# Patient Record
Sex: MALE | Race: WHITE | NOT HISPANIC OR LATINO | Employment: FULL TIME | URBAN - METROPOLITAN AREA
[De-identification: names, ages, dates, MRNs, and addresses within clinical notes are randomized per-mention and may not be internally consistent; named-entity substitution may affect disease eponyms.]

---

## 2023-05-09 ENCOUNTER — OFFICE VISIT (OUTPATIENT)
Age: 68
End: 2023-05-09

## 2023-05-09 VITALS — WEIGHT: 200.4 LBS | HEIGHT: 71 IN | TEMPERATURE: 98.2 F | BODY MASS INDEX: 28.06 KG/M2

## 2023-05-09 DIAGNOSIS — L72.0 EPIDERMAL CYST: ICD-10-CM

## 2023-05-09 DIAGNOSIS — B07.9 VERRUCA: ICD-10-CM

## 2023-05-09 DIAGNOSIS — D48.5 NEOPLASM OF UNCERTAIN BEHAVIOR OF SKIN: Primary | ICD-10-CM

## 2023-05-09 NOTE — PATIENT INSTRUCTIONS
EPIDERMAL INCLUSION CYST      Assessment and Plan:  Based on a thorough discussion of this condition and the management approach to it (including a comprehensive discussion of the known risks, side effects and potential benefits of treatment), the patient (family) agrees to implement the following specific plan:  Recommend evaluation by hand surgeon in case tendon is involoved  What are epidermal inclusion cysts? Epidermal inclusion cysts are the most common, benign cutaneous cysts  There are many different names for epidermal inclusion cysts, including epidermoid cyst, epidermal cyst, infundibular cyst, inclusion cyst, and keratin cyst  These cysts can occur anywhere on the body and typically present as nodules directly underneath the skin  There is often a visible pore or opening in the center  The cysts are freely moveable and can range from a few millimeters to several centimeters in diameter  The center of epidermoid cysts almost always contains keratin, which has a cheesy appearance, and not sebum  They also do not originate from sebaceous glands  Therefore, epidermal inclusion cysts are not the same as sebaceous cysts  Cysts may remain stable or progressively enlarge over time  There are no reliable predictive factors to tell if an epidermal inclusion cyst will enlarge, become inflamed, or remain quiescent  Infected cysts tend to become larger, turn red, and are more noticeable to the patient  There may be accompanying pain and discomfort  What causes epidermal inclusion cysts? Epidermal inclusion cysts often appear out of the blue and are not contagious  They are due to a proliferation of epidermal cells within the dermis and are more common in men than women  They occur more frequently in patients in their 20s to 45s   Epidermal inclusion cysts by themselves are usually not inherited, but they can be hereditary in rare syndromes such as South syndrome, nodular elastosis with cysts and comedones (Favre-Racouchot syndrome), and basal cell nevus syndrome (Gorlin syndrome)  Elderly patients with chronic sun-damaged skin areas have a higher likelihood of developing epidermoid cysts  They often occur in areas where hair follicles have been inflamed or repeatedly irritated are more frequent in patients with acne vulgaris  In the  period, they are called milia  Patients on BRAF inhibitors such as imiquimod and cyclosporine have a higher incidence of epidermoid cysts of the face  How do we diagnose an epidermal inclusion cyst?  Epidermoid inclusion cysts are often diagnosed by history and physical exam  There is usually no need for biopsy prior to removal   Radiographic and laboratory exams, such as ultrasound studies, are unnecessary and not typically ordered unless the practitioner suspects a genetic condition  What is the treatment for an epidermal inclusion cyst?  Inflamed, uninfected epidermal inclusion cysts rarely resolve spontaneously without therapy or surgical intervention  Treatment is not emergent unless desired by the patient  Definitive treatment is via surgical excision with walls intact  This method will prevent recurrence  This is best done when the cyst is not inflamed, to decrease the probability of rupture during surgery  A local anesthetic will be injected around the cyst  A small incision is made in the skin overlying the cyst, and contents are expressed  The incision is repaired with sutures    Another option is to use a 4mm punch biopsy with cyst extraction through the defect  Incision and drainage is often needed if the cyst is infected or inflamed  If there is surrounding cellulitis, oral antibiotic therapy may be necessary  The common agents used target methicillin sensitive Staphylococcal aureus and methicillin resistant S aureus in areas of high prevalence       INFORMED CONSENT DISCUSSION AND POST-OPERATIVE INSTRUCTIONS FOR PATIENT    I   RATIONALE FOR "PROCEDURE  I understand that a skin biopsy allows the Dermatologist to test a lesion or rash under the microscope to obtain a diagnosis  It usually involves numbing the area with numbing medication and removing a small piece of skin; sometimes the area will be closed with sutures  In this specific procedure, sutures are not usually needed  If any sutures are placed, then they are usually need to be removed in 2 weeks or less  I understand that my Dermatologist recommends that a skin \"shave\" biopsy be performed today  A local anesthetic, similar to the kind that a dentist uses when filling a cavity, will be injected with a very small needle into the skin area to be sampled  The injected skin and tissue underneath \"will go to sleep” and become numb so no pain should be felt afterwards  An instrument shaped like a tiny \"razor blade\" (shave biopsy instrument) will be used to cut a small piece of tissue and skin from the area so that a sample of tissue can be taken and examined more closely under the microscope  A slight amount of bleeding will occur, but it will be stopped with direct pressure and a pressure bandage and any other appropriate methods  I understands that a scar will form where the wound was created  Surgical ointment will be applied to help protect the wound  Sutures are not usually needed      II   RISKS AND POTENTIAL COMPLICATIONS   I understand the risks and potential complications of a skin biopsy include but are not limited to the following:  Bleeding  Infection  Pain  Scar/keloid  Skin discoloration  Incomplete Removal  Recurrence  Nerve Damage/Numbness/Loss of Function  Allergic Reaction to Anesthesia  Biopsies are diagnostic procedures and based on findings additional treatment or evaluation may be required  Loss or destruction of specimen resulting in no additional findings    My Dermatologist has explained to me the nature of the condition, the nature of the procedure, and the " "benefits to be reasonably expected compared with alternative approaches  My Dermatologist has discussed the likelihood of major risks or complications of this procedure including the specific risks listed above, such as bleeding, infection, and scarring/keloid  I understand that a scar is expected after this procedure  I understand that my physician cannot predict if the scar will form a \"keloid,\" which extends beyond the borders of the wound that is created  A keloid is a thick, painful, and bumpy scar  A keloid can be difficult to treat, as it does not always respond well to therapy, which includes injecting cortisone directly into the keloid every few weeks  While this usually reduces the pain and size of the scar, it does not eliminate it  I understand that photographs may be taken before and after the procedure  These will be maintained as part of the medical providers confidential records and may not be made available to me  I further authorize the medical provider to use the photographs for teaching purposes or to illustrate scientific papers, books, or lectures if in his/her judgment, medical research, education, or science may benefit from its use  I have had an opportunity to fully inquire about the risks and benefits of this procedure and its alternatives  I have been given ample time and opportunity to ask questions and to seek a second opinion if I wished to do so  I acknowledge that there have specifically been no guarantees as to the cosmetic results from the procedure  I am aware that with any procedure there is always the possibility of an unexpected complication  III  POST-PROCEDURAL CARE (WHAT YOU WILL NEED TO DO \"AFTER THE BIOPSY\" TO OPTIMIZE HEALING)    Keep the area clean and dry  Try NOT to remove the bandage or get it wet for the first 24 hours      Gently clean the area and apply surgical ointment (such as Vaseline petrolatum ointment, which is available \"over the " "counter\" and not a prescription) to the biopsy site for up to 2 weeks straight  This acts to protect the wound from the outside world  Sutures are not usually placed in this procedure  If any sutures were placed, return for suture removal as instructed (generally 1 week for the face, 2 weeks for the body)  Take Acetaminophen (Tylenol) for discomfort, if no contraindications  Ibuprofen or aspirin could make bleeding worse  Call our office immediately for signs of infection: fever, chills, increased redness, warmth, tenderness, discomfort/pain, or pus or foul smell coming from the wound  WHAT TO DO IF THERE IS ANY BLEEDING? If a small amount of bleeding is noticed, place a clean cloth over the area and apply firm pressure for ten minutes  Check the wound after 10 minutes of direct pressure  If bleeding persists, try one more time for an additional 10 minutes of direct pressure on the area  If the bleeding becomes heavier or does not stop after the second attempt, or if you have any other questions about this procedure, then please call your [facility] Dermatologist by calling [phone] (SKIN)  I hereby acknowledge that I have reviewed and verified the site with my Dermatologist and have requested and authorized my Dermatologist to proceed with the procedure  VERRUCA VULGARIS (\"Common Warts\")    Assessment and Plan:  Based on a thorough discussion of this condition and the management approach to it (including a comprehensive discussion of the known risks, side effects and potential benefits of treatment), the patient (family) agrees to implement the following specific plan:  Treating with liquid nitrogen at the visit today  Signed verbal consent obtained  If not resolved recommend seeing the eye surgeon for removal    Verruca Vulgaris  A verruca is a common growth of the skin caused by infection by human papilloma virus (HPV)   There are many strains of the virus that cause different " "types of warts on the body  The virus infects the most superficial layers of the skin, causing increased production of skin cells and thickening  Warts can be spread through direct contact with infected skin and may spread to other parts of the body if scratched or picked  A verruca is more commonly called a \"wart  \" Warts are particularly common in school-aged children but can arise at any age  Patients who have a history of eczema are especially prone due to impaired skin barrier  Those taking immunosuppressive drugs or with HIV infections may experience prolonged symptoms despite treatment  Warts generally have a rough surface with a tiny black dot sometimes observed in the middle of each scaly spot  They can range in size from a small bump to large scaly growths  Common warts are often found on the backs of fingers or toes, around the nails, and on the knees  Plantar warts can grow inwardly on the soles of the feet causing pain  There are many possible ways to treat warts and sometimes several different treatments are needed to get the warts to go away completely  There is no single perfect treatment for warts, and successful treatment can take many months  In-office treatments usually require multiple visits, and include:  Cryotherapy  a cold spray with liquid nitrogen will destroy the infected cells but may lead to discomfort and blistering  It may also leave a permanent white everton or scar  Electrosurgery (curettage and cautery) can be used for large resistant warts which involves shaving the growth down and burning the base  It is performed under local anesthesia and may leave a permanent scar    Candida (“yeast”) antigen injections  These are extracts of the common yeast (Candida) that cannot cause an infection  The medication is injected into/under the wart  It is thought to stimulate the immune system to recognize the wart virus and attack it   Multiple injections are often needed about " one month apart  There are also several at-home wart treatments:    Soak the warts in warm water for 5 minutes every night followed by gentle filing with a nail file or pumice stone  Topical salicylic acid or similar compounds work by removing the dead surface skin cells  Apply the medicine directly to the wart, wait for it to dry completely, then cover with duct tape overnight   Repeat until the wart is gone, which can take 2-4 months  Do not use on the face or groin area   If the wart paint makes the skin sore, stop treatment until the discomfort has settled, then recommence as above  Take care to keep the chemical off normal skin  Podophyllin is a cytotoxic agent used in some products and must not be used in pregnancy or women considering pregnancy  Some prescription medications include   Topical retinoids (adapalene, tretinoin, tazarotene), 5-fluorouracil (Efudex) or imiquimod (Aldara) creams are sometimes used to treat flat warts or warts on the face and other sensitive anatomical areas  They are usually applied directly to the warts once a day for 2-4 months and can be irritating  These treatments should only be used as directed by your health care provider  Systemic treatment with oral cimetidine (Tagamet) may help boost the immune system against the wart virus in patients, some of the time  Initiation of cimetidine therapy should ONLY be done under the supervision of your health care provider, who can discuss possible side effects and drug-to-drug interactions of this specific treatment

## 2023-05-09 NOTE — PROGRESS NOTES
"[facility] Note     Patient Name: Yoshi Briones  Encounter Date: 5/9/23     Have you been cared for by a [facility] D[facility] in the last 3 years and, if so, which description applies to you? NO  I am considered a \"new\" patient and must complete all patient intake questions  I am MALE/not capable of bearing children  REVIEW OF SYSTEMS:  Have you recently had or currently have any of the following? · Recent fever or chills? No  · Any non-healing wound? No   PAST MEDICAL HISTORY:  Have you personally ever had or currently have any of the following? If \"YES,\" then please provide more detail  · Skin cancer (such as Melanoma, Basal Cell Carcinoma, Squamous Cell Carcinoma? No  · Tuberculosis, HIV/AIDS, Hepatitis B or C: No  · Systemic Immunosuppression such as Diabetes, Biologic or Immunotherapy, Chemotherapy, Organ Transplantation, Bone Marrow Transplantation No  · Radiation Treatment No   FAMILY HISTORY:  Any \"first degree relatives\" (parent, brother, sister, or child) with the following? • Skin Cancer, Pancreatic or Other Cancer? YES, father colon cancer   PATIENT EXPERIENCE:    • Do you want the Dermatologist to perform a COMPLETE skin exam today including a clinical examination under the \"bra and underwear\" areas? NO  • If necessary, do we have your permission to call and leave a detailed message on your Preferred Phone number that includes your specific medical information? Yes      Not on File No current outpatient medications on file  • Whom besides the patient is providing clinical information about today's encounter?   o NO ADDITIONAL HISTORIAN (patient alone provided history)  o Patient here for a few spot of concern on the back, left rib left cheek, right lower eyelid  Left cheek bleeds if scratched  Bump on the right 2nd finger present for about 4 years  Sensitive when bumped      Physical Exam and Assessment/Plan by Diagnosis:    VERRUCA VULGARIS (\"Common " "Warts\")    Physical Exam:  • Anatomic Location Affected:  Right eyelid  • Morphological Description:  Warty papule  • Pertinent Positives:  • Pertinent Negatives: Additional History of Present Condition:  See above    Assessment and Plan:  Based on a thorough discussion of this condition and the management approach to it (including a comprehensive discussion of the known risks, side effects and potential benefits of treatment), the patient (family) agrees to implement the following specific plan:  • Treating with liquid nitrogen at the visit today  • Signed verbal consent obtained  • If not resolved recommend seeing the eye surgeon for removal    Verruca Vulgaris  A verruca is a common growth of the skin caused by infection by human papilloma virus (HPV)  There are many strains of the virus that cause different types of warts on the body  The virus infects the most superficial layers of the skin, causing increased production of skin cells and thickening  Warts can be spread through direct contact with infected skin and may spread to other parts of the body if scratched or picked  A verruca is more commonly called a \"wart  \" Warts are particularly common in school-aged children but can arise at any age  Patients who have a history of eczema are especially prone due to impaired skin barrier  Those taking immunosuppressive drugs or with HIV infections may experience prolonged symptoms despite treatment  Warts generally have a rough surface with a tiny black dot sometimes observed in the middle of each scaly spot  They can range in size from a small bump to large scaly growths  Common warts are often found on the backs of fingers or toes, around the nails, and on the knees  Plantar warts can grow inwardly on the soles of the feet causing pain  There are many possible ways to treat warts and sometimes several different treatments are needed to get the warts to go away completely   There is no single " perfect treatment for warts, and successful treatment can take many months  In-office treatments usually require multiple visits, and include:  1) Cryotherapy  a cold spray with liquid nitrogen will destroy the infected cells but may lead to discomfort and blistering  It may also leave a permanent white everton or scar  2) Electrosurgery (curettage and cautery) can be used for large resistant warts which involves shaving the growth down and burning the base  It is performed under local anesthesia and may leave a permanent scar    3) Candida (“yeast”) antigen injections  These are extracts of the common yeast (Candida) that cannot cause an infection  The medication is injected into/under the wart  It is thought to stimulate the immune system to recognize the wart virus and attack it  Multiple injections are often needed about one month apart  There are also several at-home wart treatments:    1) Soak the warts in warm water for 5 minutes every night followed by gentle filing with a nail file or pumice stone  2) Topical salicylic acid or similar compounds work by removing the dead surface skin cells  a  Apply the medicine directly to the wart, wait for it to dry completely, then cover with duct tape overnight   b  Repeat until the wart is gone, which can take 2-4 months  c  Do not use on the face or groin area   d  If the wart paint makes the skin sore, stop treatment until the discomfort has settled, then recommence as above   e  Take care to keep the chemical off normal skin  3) Podophyllin is a cytotoxic agent used in some products and must not be used in pregnancy or women considering pregnancy  4) Some prescription medications include   a  Topical retinoids (adapalene, tretinoin, tazarotene), 5-fluorouracil (Efudex) or imiquimod (Aldara) creams are sometimes used to treat flat warts or warts on the face and other sensitive anatomical areas   They are usually applied directly to the warts once a "day for 2-4 months and can be irritating  These treatments should only be used as directed by your health care provider  b  Systemic treatment with oral cimetidine (Tagamet) may help boost the immune system against the wart virus in patients, some of the time  Initiation of cimetidine therapy should ONLY be done under the supervision of your health care provider, who can discuss possible side effects and drug-to-drug interactions of this specific treatment  PROCEDURE:  DESTRUCTION OF BENIGN LESIONS WITH CRYOTHERAPY  After a thorough discussion of treatment options and risk/benefits/alternatives (including but not limited to local pain, scarring, dyspigmentation, blistering, and possible superinfection), verbal and written consent were obtained and the aforementioned lesions were treated on with cryotherapy using liquid nitrogen x 1 cycle for 5-10 seconds  TOTAL NUMBER of 1 lesions were treated today on the ANATOMIC LOCATION: right eyelid  The patient tolerated the procedure well, and after-care instructions were provided  NEOPLASM OF UNCERTAIN BEHAVIOR OF SKIN    Physical Exam:  • (Anatomic Location); (Size and Morphological Description); (Differential Diagnosis):  o A: Left infraorbital; 6 mm crusted pearly plaque; (DDX): Basal cell carcinoma rule Inflamed seborrheic keratosis  • Pertinent Positives:  • Pertinent Negatives: Additional History of Present Condition:  See above    Assessment and Plan:  • I have discussed with the patient that a sample of skin via a \"skin biopsy” would be potentially helpful to further make a specific diagnosis under the microscope  • Based on a thorough discussion of this condition and the management approach to it (including a comprehensive discussion of the known risks, side effects and potential benefits of treatment), the patient (family) agrees to implement the following specific plan:    o Procedure:  Skin Biopsy    After a thorough discussion of treatment " options and risk/benefits/alternatives (including but not limited to local pain, scarring, dyspigmentation, blistering, possible superinfection, and inability to confirm a diagnosis via histopathology), verbal and written consent were obtained and portion of the rash was biopsied for tissue sample  See below for consent that was obtained from patient and subsequent Procedure Note  PROCEDURE TANGENTIAL (SHAVE) BIOPSY NOTE:    • Performing Physician: Amarilis Bird  • Anatomic Location; Clinical Description with size (cm); Pre-Op Diagnosis:   o A: Left infraorbital; 6 mm crusted pearly plaque; (DDX): Basal cell carcinoma rule Inflamed seborrheic keratosis  • Post-op diagnosis: Same     • Local anesthesia: 1% Lidocaine HCL     • Topical anesthesia: None    • Hemostasis: Aluminum chloride       After obtaining informed consent  at which time there was a discussion about the purpose of biopsy  and low risks of infection and bleeding  The area was prepped and draped in the usual fashion  Anesthesia was obtained with 1% lidocaine with epinephrine  A shave biopsy to an appropriate sampling depth was obtained by Shave (Dermablade or 15 blade) The resulting wound was covered with surgical ointment and bandaged appropriately  The patient tolerated the procedure well without complications and was without signs of functional compromise  Specimen has been sent for review by Dermatopathology  Standard post-procedure care has been explained and has been included in written form within the patient's copy of Informed Consent  INFORMED CONSENT DISCUSSION AND POST-OPERATIVE INSTRUCTIONS FOR PATIENT    I   RATIONALE FOR PROCEDURE  I understand that a skin biopsy allows the Dermatologist to test a lesion or rash under the microscope to obtain a diagnosis  It usually involves numbing the area with numbing medication and removing a small piece of skin; sometimes the area will be closed with sutures   In this specific "procedure, sutures are not usually needed  If any sutures are placed, then they are usually need to be removed in 2 weeks or less  I understand that my Dermatologist recommends that a skin \"shave\" biopsy be performed today  A local anesthetic, similar to the kind that a dentist uses when filling a cavity, will be injected with a very small needle into the skin area to be sampled  The injected skin and tissue underneath \"will go to sleep” and become numb so no pain should be felt afterwards  An instrument shaped like a tiny \"razor blade\" (shave biopsy instrument) will be used to cut a small piece of tissue and skin from the area so that a sample of tissue can be taken and examined more closely under the microscope  A slight amount of bleeding will occur, but it will be stopped with direct pressure and a pressure bandage and any other appropriate methods  I understands that a scar will form where the wound was created  Surgical ointment will be applied to help protect the wound  Sutures are not usually needed  II   RISKS AND POTENTIAL COMPLICATIONS   I understand the risks and potential complications of a skin biopsy include but are not limited to the following:  • Bleeding  • Infection  • Pain  • Scar/keloid  • Skin discoloration  • Incomplete Removal  • Recurrence  • Nerve Damage/Numbness/Loss of Function  • Allergic Reaction to Anesthesia  • Biopsies are diagnostic procedures and based on findings additional treatment or evaluation may be required  • Loss or destruction of specimen resulting in no additional findings    My Dermatologist has explained to me the nature of the condition, the nature of the procedure, and the benefits to be reasonably expected compared with alternative approaches  My Dermatologist has discussed the likelihood of major risks or complications of this procedure including the specific risks listed above, such as bleeding, infection, and scarring/keloid    I understand that a " "scar is expected after this procedure  I understand that my physician cannot predict if the scar will form a \"keloid,\" which extends beyond the borders of the wound that is created  A keloid is a thick, painful, and bumpy scar  A keloid can be difficult to treat, as it does not always respond well to therapy, which includes injecting cortisone directly into the keloid every few weeks  While this usually reduces the pain and size of the scar, it does not eliminate it  I understand that photographs may be taken before and after the procedure  These will be maintained as part of the medical providers confidential records and may not be made available to me  I further authorize the medical provider to use the photographs for teaching purposes or to illustrate scientific papers, books, or lectures if in his/her judgment, medical research, education, or science may benefit from its use  I have had an opportunity to fully inquire about the risks and benefits of this procedure and its alternatives  I have been given ample time and opportunity to ask questions and to seek a second opinion if I wished to do so  I acknowledge that there have specifically been no guarantees as to the cosmetic results from the procedure  I am aware that with any procedure there is always the possibility of an unexpected complication  III  POST-PROCEDURAL CARE (WHAT YOU WILL NEED TO DO \"AFTER THE BIOPSY\" TO OPTIMIZE HEALING)    • Keep the area clean and dry  Try NOT to remove the bandage or get it wet for the first 24 hours  • Gently clean the area and apply surgical ointment (such as Vaseline petrolatum ointment, which is available \"over the counter\" and not a prescription) to the biopsy site for up to 2 weeks straight  This acts to protect the wound from the outside world  • Sutures are not usually placed in this procedure    If any sutures were placed, return for suture removal as instructed (generally 1 week for " the face, 2 weeks for the body)  • Take Acetaminophen (Tylenol) for discomfort, if no contraindications  Ibuprofen or aspirin could make bleeding worse  • Call our office immediately for signs of infection: fever, chills, increased redness, warmth, tenderness, discomfort/pain, or pus or foul smell coming from the wound  WHAT TO DO IF THERE IS ANY BLEEDING? If a small amount of bleeding is noticed, place a clean cloth over the area and apply firm pressure for ten minutes  Check the wound after 10 minutes of direct pressure  If bleeding persists, try one more time for an additional 10 minutes of direct pressure on the area  If the bleeding becomes heavier or does not stop after the second attempt, or if you have any other questions about this procedure, then please call your [address] [place] [address]'s Dermatologist by calling [phone] (SKIN)  I hereby acknowledge that I have reviewed and verified the site with my Dermatologist and have requested and authorized my Dermatologist to proceed with the procedure  EPIDERMAL INCLUSION CYST VS GIANT CELL TUMOR OR TENDON SHEATH    Physical Exam:  • Anatomic Location Affected:  Right 2nd digit  • Morphological Description:  1 cm round mobile subcutaneous nodule  • Pertinent Positives:  • Pertinent Negatives: Additional History of Present Condition:  See above    Assessment and Plan:  Based on a thorough discussion of this condition and the management approach to it (including a comprehensive discussion of the known risks, side effects and potential benefits of treatment), the patient (family) agrees to implement the following specific plan:  • Recommend evaluation by hand surgeon in case tendon is involved  What are epidermal inclusion cysts? Epidermal inclusion cysts are the most common, benign cutaneous cysts   There are many different names for epidermal inclusion cysts, including epidermoid cyst, epidermal cyst, infundibular cyst, inclusion cyst, and keratin cyst  These cysts can occur anywhere on the body and typically present as nodules directly underneath the skin  There is often a visible pore or opening in the center  The cysts are freely moveable and can range from a few millimeters to several centimeters in diameter  The center of epidermoid cysts almost always contains keratin, which has a cheesy appearance, and not sebum  They also do not originate from sebaceous glands  Therefore, epidermal inclusion cysts are not the same as sebaceous cysts  Cysts may remain stable or progressively enlarge over time  There are no reliable predictive factors to tell if an epidermal inclusion cyst will enlarge, become inflamed, or remain quiescent  Infected cysts tend to become larger, turn red, and are more noticeable to the patient  There may be accompanying pain and discomfort  What causes epidermal inclusion cysts? Epidermal inclusion cysts often appear out of the blue and are not contagious  They are due to a proliferation of epidermal cells within the dermis and are more common in men than women  They occur more frequently in patients in their 20s to 45s  Epidermal inclusion cysts by themselves are usually not inherited, but they can be hereditary in rare syndromes such as South syndrome, nodular elastosis with cysts and comedones (Favre-Racouchot syndrome), and basal cell nevus syndrome (Gorlin syndrome)  Elderly patients with chronic sun-damaged skin areas have a higher likelihood of developing epidermoid cysts  They often occur in areas where hair follicles have been inflamed or repeatedly irritated are more frequent in patients with acne vulgaris  In the  period, they are called milia  Patients on BRAF inhibitors such as imiquimod and cyclosporine have a higher incidence of epidermoid cysts of the face      How do we diagnose an epidermal inclusion cyst?  Epidermoid inclusion cysts are often diagnosed by history and physical exam  There is usually no need for biopsy prior to removal   Radiographic and laboratory exams, such as ultrasound studies, are unnecessary and not typically ordered unless the practitioner suspects a genetic condition  What is the treatment for an epidermal inclusion cyst?  Inflamed, uninfected epidermal inclusion cysts rarely resolve spontaneously without therapy or surgical intervention  Treatment is not emergent unless desired by the patient  Definitive treatment is via surgical excision with walls intact  This method will prevent recurrence  This is best done when the cyst is not inflamed, to decrease the probability of rupture during surgery  - A local anesthetic will be injected around the cyst  - A small incision is made in the skin overlying the cyst, and contents are expressed  - The incision is repaired with sutures    Another option is to use a 4mm punch biopsy with cyst extraction through the defect  Incision and drainage is often needed if the cyst is infected or inflamed  If there is surrounding cellulitis, oral antibiotic therapy may be necessary  The common agents used target methicillin sensitive Staphylococcal aureus and methicillin resistant S aureus in areas of high prevalence       Scribe Attestation    I,:  Luis E Mitchell am acting as a scribe while in the presence of the attending physician :       I,:  Radha Perry MD personally performed the services described in this documentation    as scribed in my presence :

## 2023-05-12 NOTE — RESULT ENCOUNTER NOTE
Tissue Exam: [id number]  Order: [id number]  Status: Final result     Visible to patient: No (inaccessible in [address])     Dx: Neoplasm of uncertain behavior of skin     0 Result Notes  Component   Case Report  Surgical Pathology Report                         Case: [id number]                                   Authorizing Provider: Marin Barthel, MD     Collected:           05/09/2023 [facility]              Ordering Location:     [facility]      Received:            05/09/2023 [address] [place] [address]                                                                   Pathologist:           Bernardo Hanna MD                                                           Specimen:    Skin, Other, A: Left infraorbital                                                        Final Diagnosis  A  Skin, left infraorbital, shave biopsy:     BASAL CELL CARCINOMA (NODULAR TYPE); transected        Electronically signed by Bernardo Hanna MD on 5/11/2023 at  3:03 PM      DERMATOPATHOLOGY RESULT NOTE    Results reviewed by ordering physician          Instructions for Clinical Derm Team:   (remember to route Result Note to appropriate staff):    Call patient and schedule for mohs    Result & Plan by Specimen:    Specimen A: malignant  Plan: [facility]

## 2023-05-17 ENCOUNTER — TELEPHONE (OUTPATIENT)
Dept: DERMATOLOGY | Facility: CLINIC | Age: 68
End: 2023-05-17

## 2023-05-17 DIAGNOSIS — C44.310 BASAL CELL CARCINOMA (BCC) OF SKIN OF FACE, UNSPECIFIED PART OF FACE: Primary | ICD-10-CM

## 2023-05-17 NOTE — TELEPHONE ENCOUNTER
Pt calling again asking for results of his biopsy  [facility] message [facility] Dr Kwabena Henry and [facility]

## 2023-05-17 NOTE — TELEPHONE ENCOUNTER
Biopsy results are in and reviewed by attending physician and have been forwarded to Dr Yair Roman and Dr Asher Hazel to call

## 2023-05-17 NOTE — TELEPHONE ENCOUNTER
DERMATOPATHOLOGY RESULT NOTE    Results reviewed by ordering physician  Called patient to personally discuss results  Discussed results with patient  Instructions for Clinical Derm Team:   (remember to route Result Note to appropriate staff):    Call patient and schedule for Mohs    Result & Plan by Specimen:    Specimen A: malignant  Plan: [facility]    Case Report  Surgical Pathology Report                         Case: [id number]                                   Authorizing Provider: Lollie Kocher, MD     Collected:           05/09/2023 [facility]              Ordering Location:     [facility]      Received:            05/09/2023 [address] [place] [address]                                                                   Pathologist:           Monica Nelson MD                                                           Specimen:    Skin, Other, A: Left infraorbital                                                        Final Diagnosis  A   Skin, left infraorbital, shave biopsy:     BASAL CELL CARCINOMA (NODULAR TYPE); transected

## 2023-05-17 NOTE — TELEPHONE ENCOUNTER
I don't have this biopsy result because I click done on the ones that aren't on my assigned week but I will call patient and add in the result note separately       Thanks,  [facility]

## 2023-06-01 ENCOUNTER — OFFICE VISIT (OUTPATIENT)
Dept: OBGYN CLINIC | Facility: CLINIC | Age: 68
End: 2023-06-01

## 2023-06-01 VITALS
SYSTOLIC BLOOD PRESSURE: 125 MMHG | DIASTOLIC BLOOD PRESSURE: 80 MMHG | BODY MASS INDEX: 28 KG/M2 | HEART RATE: 65 BPM | HEIGHT: 71 IN | WEIGHT: 200 LBS

## 2023-06-01 DIAGNOSIS — L72.0 EPIDERMAL CYST: ICD-10-CM

## 2023-06-01 NOTE — PROGRESS NOTES
Assessment/Plan:  1  Epidermal cyst  Ambulatory Referral to Hand Surgery          Treatment options discussed which include nonoperative and operative management  We discussed observation, aspiration, versus surgical excision  We discussed risks and benefits of each and well as expected reasonable outcomes  I recommended aspiration to determine if the mass was a fluid-filled or solid  Attempted aspiration was unsuccessful; therefore, this is most likely a solid mass  The patient was given the opportunity to ask questions  Questions were answered to the patient's satisfaction  The patient decided to move forward with aspiration via shared decision making  Discussed surgery and postop care and expectations  Patient wants to discuss with wife  Follow up in 2 weeks for possible surgical planning  If he decides to hold off on the surgery, he can cancel the appointment    CC: I have a bump on my finger    Subjective:   Sarah Galindo is a left hand dominant 74 y o  male who presents valuation and treatment of a mass located on the dorsal radial aspect of the right index finger  The mass has been present for 7 or 8 years  He believes it may have been related to a foreign body  His family has been working to convince him to get it treated  He stated the mass does not bother him unless he accidentally strikes the mass  He has not noticed any change in size of the mass over the last few years  Review of Systems 11 point review systems was negative      No past medical history on file  No past surgical history on file  No family history on file  Social History     Occupational History   • Not on file   Tobacco Use   • Smoking status: Never   • Smokeless tobacco: Never   Substance and Sexual Activity   • Alcohol use: Not on file   • Drug use: Never   • Sexual activity: Not on file       No current outpatient medications on file      No Known Allergies    Objective:  Vitals:    06/01/23 1421   BP: 125/80   Pulse: 65       The patient was awake, alert, and oriented to person, place, and time  No acute distress  Normocephalic  EOMI  Mucous membranes moist   Normal respiratory effort  Examination of the affected extremity was compared to the unaffected extremity  Skin was intact  No swelling or ecchymosis  No deformity except for the right index finger  Hand and fingers were warm and well-perfused  Capillary refill was brisk  Full active range of motion of the elbows, forearms, wrists, and fingers  Well-circumscribed mass located on the dorsal radial aspect of the right index finger  It was nontender  It was not mobile  It measured approximately 1 cm in diameter by 1 cm from the skin surface  Imaging:  No imaging obtained at this visit  Hand/upper extremity injection  Universal Protocol:  Consent: Verbal consent obtained  Risks and benefits: risks, benefits and alternatives were discussed  Consent given by: patient    Supporting Documentation  Indications: diagnostic   Procedure Details  Condition comment: Aspiration of dorsal finger mass   Preparation: Patient was prepped and draped in the usual sterile fashion  Needle size: 18 G  Aspirate: blood-tinged    Patient tolerance: patient tolerated the procedure well with no immediate complications  Dressing:  Sterile dressing applied    Scant amount of blood  No fluid aspirated  This document was created using speech voice recognition software  Grammatical errors, random word insertions, pronoun errors, and incomplete sentences are an occasional consequence of this system due to software limitations, ambient noise, and hardware issues  Any formal questions or concerns about content, text, or information contained within the body of this dictation should be directly addressed to the provider for clarification

## 2023-08-15 ENCOUNTER — PROCEDURE VISIT (OUTPATIENT)
Dept: DERMATOLOGY | Facility: CLINIC | Age: 68
End: 2023-08-15
Payer: COMMERCIAL

## 2023-08-15 VITALS
WEIGHT: 193 LBS | HEART RATE: 71 BPM | SYSTOLIC BLOOD PRESSURE: 126 MMHG | OXYGEN SATURATION: 97 % | DIASTOLIC BLOOD PRESSURE: 86 MMHG | BODY MASS INDEX: 26.92 KG/M2 | TEMPERATURE: 97.6 F

## 2023-08-15 DIAGNOSIS — C44.310 BASAL CELL CARCINOMA (BCC) OF SKIN OF FACE, UNSPECIFIED PART OF FACE: ICD-10-CM

## 2023-08-15 PROCEDURE — 14041 TIS TRNFR F/C/C/M/N/A/G/H/F: CPT | Performed by: STUDENT IN AN ORGANIZED HEALTH CARE EDUCATION/TRAINING PROGRAM

## 2023-08-15 PROCEDURE — 17311 MOHS 1 STAGE H/N/HF/G: CPT | Performed by: STUDENT IN AN ORGANIZED HEALTH CARE EDUCATION/TRAINING PROGRAM

## 2023-08-15 NOTE — PATIENT INSTRUCTIONS
Mohs Microscopic Surgery After Care    WOUND CARE AFTER SURGERY:    Do NOT to remove the pressure bandage for 48 hours. Keep the area clean and dry while this bandage is on. After removing the bandage for the first time, gently clean the area with soap and water. If the bandage is difficult to remove, getting the bandage wet in the shower will sometimes help soften the adhesive and allow it to be removed more easily. You will now need to cleanse this area daily in the shower with gentle soap. There is no need to scrub the area. Apply plain Vaseline ointment (this is over the counter and not a prescription) to the site followed by a clean appropriately sized bandage to area. Non stick dressing and paper tape (or Hypafix) are recommended for sensitive skin but a bandaid is fine if it covers the area well. You will need to continue the above daily wound care until you return for suture removal in 7 days (generally 7 days for the face, 10-14 days off the face)      RESTRICTIONS:     For two DAYS:   - You will need to take it very easy as this time is highest risk for bleeding. Being a "couch potato" during these two days is generally recommended. - For surgeries on the face/neck/scalp: Avoid leaning down to pick things up off the floor as this brings blood up to your head. Instead, squat down to pick things up. For two WEEKS:   - No heavy lifting (anything greater than 10 pounds)   - You can start to do slow, gentle activities such as slow walking but nothing to increase your heart rate and blood pressure too much (such as cardiovascular exercise). It is important to take it easy as there is still a risk for bleeding and a high risk popping of stitches open during this time. If we did surgery near the eyes (including the nose, forehead, front part of your scalp, cheeks): It is VERY common to get a large amount of swelling around your eyes (puffy eyes).  Although less frequent, this can be enough to swell your eyes shut and can also come along with bruising. This should not hurt and is very expected and normal. It is typically worst at ~ 3 days out from your surgery and dramatically better 1 week post-operatively. MANAGING YOUR PAIN AFTER SURGERY     You can expect to have some pain after surgery. This is normal. The pain is typically worse the first two days after surgery, and quickly begins to get better. The best strategy for controlling your pain after surgery is around the clock pain control. You can take over the counter Acetaminophen (Tylenol) for discomfort, if no contraindications. If you are taking this at the maximum dose, you can alternate this with Motrin (ibuprofen or Advil) as well. Alternating these medications with each other allows you to maximize your pain control. In addition to Tylenol and Motrin, you can use heating pads or ice packs on your incisions to help reduce your pain. How will I alternate your regular strength over-the-counter pain medication? You will take a dose of pain medication every three hours. Start by taking 650 mg of Tylenol (2 pills of 325 mg)   3 hours later take 600 mg of Motrin (3 pills of 200 mg)   3 hours after taking the Motrin take 650 mg of Tylenol   3 hours after that take 600 mg of Motrin. See example - if your first dose of Tylenol is at 12:00 PM     12:00 PM  Tylenol 650 mg (2 pills of 325 mg)    3:00 PM  Motrin 600 mg (3 pills of 200 mg)    6:00 PM  Tylenol 650 mg (2 pills of 325 mg)    9:00 PM  Motrin 600 mg (3 pills of 200 mg)    Continue alternating every 3 hours      Important:   Do not take more than 4000mg of Tylenol or 3200mg of Motrin in a 24-hour period. What if I still have pain? If you have pain that is not controlled with the over-the-counter pain medications (Tylenol and Motrin or Advil), don't hesitate to call our staff using the number provided.  We will help make sure you are managing your pain in the best way possible, and if necessary, we can provide a prescription for additional pain medication. CALL OUR OFFICE IMMEDIATELY FOR ANY SIGNS OF INFECTION:    This includes fever, chills, increased redness, warmth, tenderness, severe discomfort/pain, or pus or foul smell coming from the wound. If you are experiencing any of the above, please call [facility] directly at [phone] (SKIN)    IF BLEEDING IS NOTICED:    Place a clean cloth over the area and apply firm pressure for thirty minutes. Check the wound ONLY after 30 minutes of direct pressure; do not cheat and sneak a peak, as that does not count. If bleeding persists after 30 minutes of legitimate direct pressure, then try one more round of direct pressure to the area. Should the bleeding become heavier or not stop after the second attempt, call [facility] directly at [phone] (SKIN). Your call will get routed to the dermatology surgeon on call even after hours.

## 2023-08-15 NOTE — LETTER
August 15, 2023     Patient: Marie Boyd  YOB: 1955  Date of Visit: 8/15/2023      To Whom it May Concern:    Alicja Pierce is under my professional care. Cathleen Martinez was seen in my office on 8/15/2023. Cathleen Martinez may return to work with limitations 08/18/2023 . Patient should not be lifting anything over 10 lb until Friday 08/18/2023. If you have any questions or concerns, please don't hesitate to call.          Sincerely,          Rafael Alberto MD

## 2023-08-15 NOTE — PROGRESS NOTES
MOHS Procedure Note    Patient: Evonne Truong  : 1955  MRN: [id number]  Date: 08/15/2023    History of Present Illness: The patient is a 74 y.o. male who presents with complaints of Basal Cell Carcinoma Left infraorbital.     Past Medical History:   Diagnosis Date   • Basal cell carcinoma 08/15/2023    Left infraortbital       Past Surgical History:   Procedure Laterality Date   • MOHS SURGERY Left 08/15/2023    BCC- Left infraorbital       No current outpatient medications on file.     No Known Allergies    Physical Exam:   Vitals:    08/15/23 0902   BP: 126/86   Pulse: 71   Temp: 97.6 °F (36.4 °C)   SpO2: 97%     General: Awake, Alert, Oriented x 3, Mood and affect appropriate  Respiratory: Respirations even and unlabored  Cardiovascular: Peripheral pulses intact; no edema  Musculoskeletal Exam: n/a    Skin: 0.7 x 0.9 cm pink macule, atrophic and shiny on the left infraorbital cheek    Assessment: Biopsy proven to be Basal Cell Carcinoma on the Left infraorbital.     Plan: MOHS    Time of H&P Completion: 9:15 am    MOHS Procedure Timeout    Flowsheet Row Most Recent Value   Timeout: 7773   Patient Identity Verified: Yes   Correct Site Verified: Yes   Correct Procedure Verified: Yes          MOHS Diagnosis/Indication/Location/ID    Flowsheet Row Most Recent Value   Pathology Type Basal cell carcinoma   Anatomic Site --  [Left infraorbital]   Indications for MOHS tumor location   MOHS ID [id number]          MOHS Site/Accession/Pre-Post    Flowsheet Row Most Recent Value   Original Site Identified (as submitted by referring clinician) Referral, Photo   Biopsy Accession/Specimen # (as submitted by referring clincian) [id number]   Pre-MOHS Size Length (cm) 0.7   Pre-MOHS Size Width (cm) 0.9   Post-MOHS Size-Length (cm) 1.3   Post MOHS Size-Width (cm) 1.1   Repair Type Advancement flap   Suture Type Prolene, Monocryl plus   Monocryl Plus Suture Size 5   Prolene Suture Size 6   Final repair length (cm): 12 Anesthetic Used 1% Lidocaine with epinephrine          MOHS Tumor Stage 1 Information    Flowsheet Row Most Recent Value   Tissue Sections (blocks) 2   Microscopic Exam Section 1: No tumor identified in section. Microscopic Exam Section 2: No tumor identified in section. Tumor Clear After Stage I? Yes                    Patient identified, procedure verified, site identified and verified. Time out completed. Surgical removal of the lesion discussed with the patient (risks and benefits, including possibility of scarring, infection, recurrence or potential for further treatment)  I have specifically identified the site with the patient. I have discussed the fact that the patient will have a scar after the procedure regardless of granulation or repair with sutures. I have discussed that the repair options can range from granulation in some cases to linear or curvilinear closures to larger flaps or grafts. There are sometimes flaps or grafts used that require multiples stages of surgery and will not be completed today, rather be completed over a series of appointments. I have discussed that occasionally due to location, size or depth of the lesion I may recommend consultation with and transfer of care for further removal or the reconstruction to another provider such as ophthalmology surgery, plastic surgery, ENT surgery, or surgical oncology. There are cases in which other testing such as imaging with MRI or CT scan or testing of lymph nodes is recommended because of the nature/depth/location of tumor seen during the removal. There is a risk of injury to nerves causing temporary or permanent numbness or the inability to move muscles full such as the inability to lift eyebrows. Questions answered and verbal and written consent was obtained. The tumor qualifies for Mohs based on AUC criteria. Dr. Allyson Wilkerson served as the surgeon and pathologist during the procedure.     With the patient in the supine position and

## 2023-08-22 ENCOUNTER — OFFICE VISIT (OUTPATIENT)
Dept: DERMATOLOGY | Facility: CLINIC | Age: 68
End: 2023-08-22

## 2023-08-22 DIAGNOSIS — Z48.02 ENCOUNTER FOR REMOVAL OF SUTURES: Primary | ICD-10-CM

## 2023-08-22 PROCEDURE — 99024 POSTOP FOLLOW-UP VISIT: CPT | Performed by: STUDENT IN AN ORGANIZED HEALTH CARE EDUCATION/TRAINING PROGRAM

## 2023-08-22 NOTE — PROGRESS NOTES
Suture removal    Date/Time: 8/22/2023 3:30 PM    Performed by: Roe Goldstein RN  Authorized by: Shilpa Paz MD  Universal Protocol:  Consent: Verbal consent obtained. Written consent not obtained. Risks and benefits: risks, benefits and alternatives were discussed  Consent given by: patient  Time out: Immediately prior to procedure a "time out" was called to verify the correct patient, procedure, equipment, support staff and site/side marked as required. Timeout called at: 8/22/2023 3:21 PM.  Patient understanding: patient states understanding of the procedure being performed  Patient consent: the patient's understanding of the procedure matches consent given  Procedure consent: procedure consent matches procedure scheduled  Relevant documents: relevant documents present and verified  Test results: test results not available  Site marked: the operative site was not marked  Radiology Images displayed and confirmed. If images not available, report reviewed: imaging studies not available  Patient identity confirmed: verbally with patient        Patient location:  Clinic  Location:     Laterality:  Left    Location:  Head/neck (infraorbital)  Procedure details: Tools used:  Suture removal kit    Wound appearance:  No sign(s) of infection, good wound healing, clean, moist, nonpurulent, nontender and pink    Number of sutures removed:  23  Post-procedure details:     Post-procedure assessment: vaselien ointment applied. Patient tolerance of procedure: Tolerated well, no immediate complications  Comments:      Patient was encouraged to continue to clean and care for the wound until fully healed. Patient was encouraged to continue to follow up for regular full body skin exams as scheduled.                    Scribe Attestation    I,:  Roe Goldstein RN am acting as a scribe while in the presence of the attending physician.:       I,:  Shilpa Paz MD personally performed the services described in this documentation    as scribed in my presence.:

## 2023-09-15 ENCOUNTER — TELEPHONE (OUTPATIENT)
Dept: DERMATOLOGY | Facility: CLINIC | Age: 68
End: 2023-09-15

## 2023-09-15 NOTE — TELEPHONE ENCOUNTER
Patient called in today. Stated that under his eye is still puffy. Stated the coloring is still pink since the post op appointment. Denies redness, heat to touch, pain, or drainage. Stated that the area feels lumpy and it still puffy. Denies fever or chills. Asked for patient to send a photo to us through [employer]. He stated he wont be able to do it today because he is working but he will send it this weekend. Advised any concerns of infection as listed above he should report to urgent care but if he has some incisional concerns that have been ongoing since his post op we would be happy to see him in the office next week as well. Understanding verbalized. Please be advised.

## 2025-05-15 NOTE — TELEPHONE ENCOUNTER
Assessment/Plan:  Follow up after testing.   Diagnoses and all orders for this visit:    Dysphagia, unspecified type  -     FL barium swallow video w speech; Future    Bilateral impacted cerumen    Other orders  -     Ambulatory Referral to Otolaryngology           There are no Patient Instructions on file for this visit.      Patient ID: Luis Forrester is a 58 y.o. male.      Subjective: Pt is here for swallowing difficulty after neck and back surgery after a fall last summer. He currently has a feeding tube but can take some pills by mouth. He has a cough also that he has had since surgery.        The following portions of the patient's history were reviewed and updated as appropriate: allergies, current medications, past family history, past medical history, past social history, past surgical history and problem list.    Review of Systems    Objective:    /79 (BP Location: Left arm, Patient Position: Sitting, Cuff Size: Standard)   Pulse 99   Temp 98 °F (36.7 °C) (Temporal)   Wt 77.6 kg (171 lb)   SpO2 98%   BMI 26.78 kg/m²     Physical Exam   Constitutional: Oriented to person, place, and time. Well-developed and well-nourished, no apparent distress, non-toxic appearance. Cooperative, able to hear and answer questions without difficulty.    Voice: Normal voice quality.  Head: Normocephalic, atraumatic.  No scars, masses or lesions.  Face: Symmetric, no edema, no sinus tenderness.  Right Ear: External ear normal.  Auditory canal with impacted cerumen.  No post-auricular erythema or tenderness. Tympanic membrane intact and normal.   Left Ear: External ear normal.  Auditory canal with impacted cerumen.  No post-auricular erythema or tenderness. Tympanic membrane intact and normal.  Nose: Septum midline, nares clear.  Mucosa moist, turbinates well appearing.  No crusting, polyps or discharge evident.  Oral cavity: Dentition intact.  Mucosa moist, lips normal.  Tongue mobile, floor of mouth normal.  Hard  Patient is being referred to a orthopedics. Please schedule accordingly.     [address] [facility]   [phone] palate unremarkable.  No masses or lesions.   Oropharynx: Uvula is midline, soft palate normal.  Unremarkable oropharyngeal inlet.  Tonsils unremarkable.  Posterior pharyngeal wall clear. No masses or lesions.  Skin: Skin is warm and dry.   Psychiatric: Normal mood and affect.    Procedure:   Both ear(s) were visualized using an operating otoscope.  Using suction,  both ear(s) were cleaned of all cerumen.  The patient tolerated the procedure well.

## 2025-07-02 ENCOUNTER — APPOINTMENT (EMERGENCY)
Dept: CT IMAGING | Facility: HOSPITAL | Age: 70
DRG: 669 | End: 2025-07-02
Payer: COMMERCIAL

## 2025-07-02 ENCOUNTER — HOSPITAL ENCOUNTER (INPATIENT)
Facility: HOSPITAL | Age: 70
LOS: 1 days | Discharge: HOME | DRG: 669 | End: 2025-07-06
Attending: SURGERY | Admitting: INTERNAL MEDICINE
Payer: COMMERCIAL

## 2025-07-02 DIAGNOSIS — R73.9 ELEVATED BLOOD SUGAR: ICD-10-CM

## 2025-07-02 DIAGNOSIS — R11.0 NAUSEA: ICD-10-CM

## 2025-07-02 DIAGNOSIS — K29.70 GASTRITIS: ICD-10-CM

## 2025-07-02 DIAGNOSIS — R52 INTRACTABLE PAIN: ICD-10-CM

## 2025-07-02 DIAGNOSIS — N20.0 NEPHROLITHIASIS: ICD-10-CM

## 2025-07-02 DIAGNOSIS — N13.9 OBSTRUCTIVE UROPATHY: Primary | ICD-10-CM

## 2025-07-02 DIAGNOSIS — K59.00 CONSTIPATION: ICD-10-CM

## 2025-07-02 DIAGNOSIS — Z87.442 PERSONAL HISTORY OF KIDNEY STONES: ICD-10-CM

## 2025-07-02 DIAGNOSIS — R03.0 ELEVATED BP WITHOUT DIAGNOSIS OF HYPERTENSION: ICD-10-CM

## 2025-07-02 LAB
ALBUMIN SERPL BCG-MCNC: 3.8 G/DL (ref 3.5–5)
ALP SERPL-CCNC: 62 U/L (ref 34–104)
ALT SERPL W P-5'-P-CCNC: 13 U/L (ref 7–52)
ANION GAP SERPL CALCULATED.3IONS-SCNC: 6 MMOL/L (ref 4–13)
AST SERPL W P-5'-P-CCNC: 14 U/L (ref 13–39)
ATRIAL RATE: 80 BPM
BACTERIA UR QL AUTO: ABNORMAL /HPF
BASOPHILS # BLD AUTO: 0.02 THOUSANDS/ÂΜL (ref 0–0.1)
BASOPHILS NFR BLD AUTO: 0 % (ref 0–1)
BILIRUB SERPL-MCNC: 0.65 MG/DL (ref 0.2–1)
BILIRUB UR QL STRIP: NEGATIVE
BUN SERPL-MCNC: 19 MG/DL (ref 5–25)
CALCIUM SERPL-MCNC: 8.7 MG/DL (ref 8.4–10.2)
CHLORIDE SERPL-SCNC: 103 MMOL/L (ref 96–108)
CLARITY UR: CLEAR
CO2 SERPL-SCNC: 25 MMOL/L (ref 21–32)
COLOR UR: YELLOW
CREAT SERPL-MCNC: 1.21 MG/DL (ref 0.6–1.3)
EOSINOPHIL # BLD AUTO: 0.01 THOUSAND/ÂΜL (ref 0–0.61)
EOSINOPHIL NFR BLD AUTO: 0 % (ref 0–6)
ERYTHROCYTE [DISTWIDTH] IN BLOOD BY AUTOMATED COUNT: 13.9 % (ref 11.6–15.1)
EST. AVERAGE GLUCOSE BLD GHB EST-MCNC: 126 MG/DL
GFR SERPL CREATININE-BSD FRML MDRD: 61 ML/MIN/1.73SQ M
GLUCOSE SERPL-MCNC: 183 MG/DL (ref 65–140)
GLUCOSE UR STRIP-MCNC: ABNORMAL MG/DL
HBA1C MFR BLD: 6 %
HCT VFR BLD AUTO: 47.4 % (ref 36.5–49.3)
HGB BLD-MCNC: 15.5 G/DL (ref 12–17)
HGB UR QL STRIP.AUTO: ABNORMAL
IMM GRANULOCYTES # BLD AUTO: 0.06 THOUSAND/UL (ref 0–0.2)
IMM GRANULOCYTES NFR BLD AUTO: 0 % (ref 0–2)
KETONES UR STRIP-MCNC: ABNORMAL MG/DL
LEUKOCYTE ESTERASE UR QL STRIP: NEGATIVE
LYMPHOCYTES # BLD AUTO: 0.65 THOUSANDS/ÂΜL (ref 0.6–4.47)
LYMPHOCYTES NFR BLD AUTO: 4 % (ref 14–44)
MCH RBC QN AUTO: 30.6 PG (ref 26.8–34.3)
MCHC RBC AUTO-ENTMCNC: 32.7 G/DL (ref 31.4–37.4)
MCV RBC AUTO: 94 FL (ref 82–98)
MONOCYTES # BLD AUTO: 1.47 THOUSAND/ÂΜL (ref 0.17–1.22)
MONOCYTES NFR BLD AUTO: 10 % (ref 4–12)
MUCOUS THREADS UR QL AUTO: ABNORMAL
NEUTROPHILS # BLD AUTO: 12.43 THOUSANDS/ÂΜL (ref 1.85–7.62)
NEUTS SEG NFR BLD AUTO: 86 % (ref 43–75)
NITRITE UR QL STRIP: NEGATIVE
NON-SQ EPI CELLS URNS QL MICRO: ABNORMAL /HPF
NRBC BLD AUTO-RTO: 0 /100 WBCS
P AXIS: 68 DEGREES
PH UR STRIP.AUTO: 6 [PH]
PLATELET # BLD AUTO: 265 THOUSANDS/UL (ref 149–390)
PMV BLD AUTO: 9.6 FL (ref 8.9–12.7)
POTASSIUM SERPL-SCNC: 3.5 MMOL/L (ref 3.5–5.3)
PR INTERVAL: 200 MS
PROT SERPL-MCNC: 7 G/DL (ref 6.4–8.4)
PROT UR STRIP-MCNC: ABNORMAL MG/DL
QRS AXIS: -7 DEGREES
QRSD INTERVAL: 116 MS
QT INTERVAL: 388 MS
QTC INTERVAL: 447 MS
RBC # BLD AUTO: 5.07 MILLION/UL (ref 3.88–5.62)
RBC #/AREA URNS AUTO: ABNORMAL /HPF
SODIUM SERPL-SCNC: 134 MMOL/L (ref 135–147)
SP GR UR STRIP.AUTO: >=1.05 (ref 1–1.03)
T WAVE AXIS: 15 DEGREES
UROBILINOGEN UR STRIP-ACNC: <2 MG/DL
VENTRICULAR RATE: 80 BPM
WBC # BLD AUTO: 14.64 THOUSAND/UL (ref 4.31–10.16)
WBC #/AREA URNS AUTO: ABNORMAL /HPF

## 2025-07-02 PROCEDURE — 74177 CT ABD & PELVIS W/CONTRAST: CPT

## 2025-07-02 PROCEDURE — 93005 ELECTROCARDIOGRAM TRACING: CPT

## 2025-07-02 PROCEDURE — 36415 COLL VENOUS BLD VENIPUNCTURE: CPT | Performed by: EMERGENCY MEDICINE

## 2025-07-02 PROCEDURE — 96375 TX/PRO/DX INJ NEW DRUG ADDON: CPT

## 2025-07-02 PROCEDURE — 99285 EMERGENCY DEPT VISIT HI MDM: CPT

## 2025-07-02 PROCEDURE — 80053 COMPREHEN METABOLIC PANEL: CPT | Performed by: EMERGENCY MEDICINE

## 2025-07-02 PROCEDURE — 93010 ELECTROCARDIOGRAM REPORT: CPT | Performed by: INTERNAL MEDICINE

## 2025-07-02 PROCEDURE — 99285 EMERGENCY DEPT VISIT HI MDM: CPT | Performed by: EMERGENCY MEDICINE

## 2025-07-02 PROCEDURE — 81001 URINALYSIS AUTO W/SCOPE: CPT | Performed by: EMERGENCY MEDICINE

## 2025-07-02 PROCEDURE — 99223 1ST HOSP IP/OBS HIGH 75: CPT | Performed by: INTERNAL MEDICINE

## 2025-07-02 PROCEDURE — 99223 1ST HOSP IP/OBS HIGH 75: CPT | Performed by: UROLOGY

## 2025-07-02 PROCEDURE — 83036 HEMOGLOBIN GLYCOSYLATED A1C: CPT

## 2025-07-02 PROCEDURE — 96365 THER/PROPH/DIAG IV INF INIT: CPT

## 2025-07-02 PROCEDURE — 85025 COMPLETE CBC W/AUTO DIFF WBC: CPT | Performed by: EMERGENCY MEDICINE

## 2025-07-02 RX ORDER — DOCUSATE SODIUM 100 MG/1
100 CAPSULE, LIQUID FILLED ORAL 2 TIMES DAILY
Status: DISCONTINUED | OUTPATIENT
Start: 2025-07-02 | End: 2025-07-06 | Stop reason: HOSPADM

## 2025-07-02 RX ORDER — HYDROMORPHONE HCL/PF 1 MG/ML
0.6 SYRINGE (ML) INJECTION ONCE
Status: COMPLETED | OUTPATIENT
Start: 2025-07-02 | End: 2025-07-02

## 2025-07-02 RX ORDER — ACETAMINOPHEN 325 MG/1
650 TABLET ORAL EVERY 6 HOURS PRN
Status: DISCONTINUED | OUTPATIENT
Start: 2025-07-02 | End: 2025-07-03 | Stop reason: SDUPTHER

## 2025-07-02 RX ORDER — SODIUM CHLORIDE 9 MG/ML
125 INJECTION, SOLUTION INTRAVENOUS CONTINUOUS
Status: DISCONTINUED | OUTPATIENT
Start: 2025-07-02 | End: 2025-07-05

## 2025-07-02 RX ORDER — HYDROMORPHONE HCL/PF 1 MG/ML
0.5 SYRINGE (ML) INJECTION EVERY 4 HOURS PRN
Status: DISCONTINUED | OUTPATIENT
Start: 2025-07-02 | End: 2025-07-03

## 2025-07-02 RX ORDER — SENNOSIDES 8.6 MG
1 TABLET ORAL DAILY
Status: DISCONTINUED | OUTPATIENT
Start: 2025-07-02 | End: 2025-07-06 | Stop reason: HOSPADM

## 2025-07-02 RX ORDER — TAMSULOSIN HYDROCHLORIDE 0.4 MG/1
0.4 CAPSULE ORAL
Status: DISCONTINUED | OUTPATIENT
Start: 2025-07-03 | End: 2025-07-06 | Stop reason: HOSPADM

## 2025-07-02 RX ORDER — ONDANSETRON 2 MG/ML
4 INJECTION INTRAMUSCULAR; INTRAVENOUS EVERY 6 HOURS PRN
Status: DISCONTINUED | OUTPATIENT
Start: 2025-07-02 | End: 2025-07-06 | Stop reason: HOSPADM

## 2025-07-02 RX ORDER — KETOROLAC TROMETHAMINE 30 MG/ML
15 INJECTION, SOLUTION INTRAMUSCULAR; INTRAVENOUS ONCE
Status: COMPLETED | OUTPATIENT
Start: 2025-07-02 | End: 2025-07-02

## 2025-07-02 RX ORDER — ACETAMINOPHEN 325 MG/1
650 TABLET ORAL EVERY 6 HOURS PRN
COMMUNITY

## 2025-07-02 RX ORDER — HYDROMORPHONE HCL/PF 1 MG/ML
0.5 SYRINGE (ML) INJECTION EVERY 4 HOURS PRN
Status: DISCONTINUED | OUTPATIENT
Start: 2025-07-02 | End: 2025-07-02

## 2025-07-02 RX ADMIN — SENNOSIDES 8.6 MG: 8.6 TABLET, FILM COATED ORAL at 12:34

## 2025-07-02 RX ADMIN — HYDROMORPHONE HYDROCHLORIDE 0.5 MG: 1 INJECTION, SOLUTION INTRAMUSCULAR; INTRAVENOUS; SUBCUTANEOUS at 18:50

## 2025-07-02 RX ADMIN — KETOROLAC TROMETHAMINE 15 MG: 30 INJECTION, SOLUTION INTRAMUSCULAR; INTRAVENOUS at 08:17

## 2025-07-02 RX ADMIN — DOCUSATE SODIUM 100 MG: 100 CAPSULE, LIQUID FILLED ORAL at 17:38

## 2025-07-02 RX ADMIN — SODIUM CHLORIDE 125 ML/HR: 0.9 INJECTION, SOLUTION INTRAVENOUS at 21:26

## 2025-07-02 RX ADMIN — DOCUSATE SODIUM 100 MG: 100 CAPSULE, LIQUID FILLED ORAL at 12:34

## 2025-07-02 RX ADMIN — SODIUM CHLORIDE, SODIUM LACTATE, POTASSIUM CHLORIDE, AND CALCIUM CHLORIDE 1000 ML: .6; .31; .03; .02 INJECTION, SOLUTION INTRAVENOUS at 08:17

## 2025-07-02 RX ADMIN — HYDROMORPHONE HYDROCHLORIDE 0.6 MG: 1 INJECTION, SOLUTION INTRAMUSCULAR; INTRAVENOUS; SUBCUTANEOUS at 09:07

## 2025-07-02 RX ADMIN — IOHEXOL 80 ML: 350 INJECTION, SOLUTION INTRAVENOUS at 08:35

## 2025-07-02 RX ADMIN — SODIUM CHLORIDE 125 ML/HR: 0.9 INJECTION, SOLUTION INTRAVENOUS at 12:34

## 2025-07-02 NOTE — ASSESSMENT & PLAN NOTE
-70yo male with no significant known past medical history presenting today for left-sided flank pain and being admitted for uncontrolled pain secondary to 5mm obstructive nephrolithiasis.   -Associated symptoms of nausea, vomiting, constipation, difficulty urinating.   -CT remarkable for 5 mm distal left ureteral calculus at the vesicoureteral junction eliciting left renal inflammation and mild left hydroureteronephrosis.  -UA remarkable for glucose, trace ketones, blood and protein.  -Recommend admission for pain control and OR for calculus removal.    Plan:  -Urology consulted - plan for OR 7/3/2025; NPO at midnight   -Bladder scan to assess to urinary retention   -125cc/hr NS  -Dilaudid 0.5mg q4 PRN  -Tylenol 650 q6 PRN  -Zofran 4mg q6 PRN  -Colace 100mg BID  -Senna 8.6mg daily

## 2025-07-02 NOTE — H&P (VIEW-ONLY)
Consultation - Urology   Name: Cj Angelo 69 y.o. male I MRN: [id number]  Unit/Bed#: W -01 I Date of Admission: 7/2/2025   Date of Service: 7/2/2025 I Hospital Day: 0   Inpatient consult to Urology  Consult performed by: Silva Graves PA-C  Consult ordered by: Kristel Zavala DO        Physician Requesting Evaluation: Dragan Ruffin DO   Reason for Evaluation / Principal Problem: Ureteral calculus    Assessment & Plan  Obstructive Nephrolithasis  CT showing a 5 mm distal left ureteral stone just prior to the UVJ.  WBC 14  UA negative  Creatinine at baseline  Diagnosed with stone on 6/30--presenting due to uncontrolled pain      Plan:  IV fluids, antiemetics  Strain all urine  Flomax  Reassess for stone passage on 7/3.  If uncontrolled pain and no stone passage, patient is added on for ureteroscopy  N.p.o. at midnight  Elevated blood sugar          Subjective:   HPI: Cj is a 69-year-old male who presented to the ED with left-sided flank pain.  Patient reports pain started on Monday morning.  He was seen by [facility] on Monday which showed a 5 mm mid ureteral stone with mild hydronephrosis.  He was discharged with Tylenol, Percocet, Flomax.  Despite pain medication at home patient reports uncontrolled pain.  He reports associated symptoms of nausea and vomiting.  He denies any fever, chills, hematuria.  In the ED he had a white blood cell count of 14, UA unremarkable.  CT showing a 5 mm distal left ureteral stone just prior to the UVJ.  Currently patient is resting comfortably in bed reports his pain is currently controlled.  We discussed 5 mm ureteral stone, and possible intervention if no stone passage and continued pain tomorrow morning.  He will be n.p.o. at midnight.      Review of Systems   Constitutional:  Negative for chills and fever.   Respiratory:  Negative for cough and shortness of breath.    Cardiovascular:  Negative for chest pain and palpitations.    Gastrointestinal:  Positive for abdominal pain. Negative for vomiting.   Genitourinary:  Negative for dysuria and hematuria.   Musculoskeletal:  Positive for back pain. Negative for arthralgias.   Skin:  Negative for color change and rash.   Neurological:  Negative for seizures and syncope.   All other systems reviewed and are negative.      Objective:    Vitals: Blood pressure (!) 152/102, pulse 85, temperature 98.2 °F (36.8 °C), resp. rate 16, SpO2 96%.,There is no height or weight on file to calculate BMI.    Physical Exam  Constitutional:       General: He is not in acute distress.     Appearance: He is not ill-appearing or toxic-appearing.   HENT:      Head: Normocephalic and atraumatic.      Right Ear: External ear normal.      Left Ear: External ear normal.      Nose: Nose normal.      Mouth/Throat:      Mouth: Mucous membranes are moist.     Eyes:      General: No scleral icterus.     Conjunctiva/sclera: Conjunctivae normal.       Cardiovascular:      Rate and Rhythm: Normal rate.      Pulses: Normal pulses.   Pulmonary:      Effort: Pulmonary effort is normal.   Abdominal:      General: There is no distension.      Tenderness: There is no abdominal tenderness. There is no guarding.     Neurological:      General: No focal deficit present.      Mental Status: He is oriented to person, place, and time. Mental status is at baseline.     Psychiatric:         Mood and Affect: Mood normal.         Behavior: Behavior normal.         Thought Content: Thought content normal.         Judgment: Judgment normal.         Labs:  Recent Labs     07/02/25  0744   WBC 14.64*       Recent Labs     07/02/25  0744   HGB 15.5     Recent Labs     07/02/25  0744   HCT 47.4     Recent Labs     07/02/25  0744   CREATININE 1.21         History:  Past Medical History[1]  Social History[2]  Past Surgical History[3]  Family History[4]    Silva Graves PA-C  Date: 7/2/2025 Time: 1:23 PM          [1]   Past Medical  History:  Diagnosis Date    Basal cell carcinoma 08/15/2023    Left infraortbital   [2]   Social History  Socioeconomic History    Marital status: Unknown   Tobacco Use    Smoking status: Never    Smokeless tobacco: Never   Substance and Sexual Activity    Alcohol use: Not Currently    Drug use: Not Currently   [3]   Past Surgical History:  Procedure Laterality Date    MOHS SURGERY Left 08/15/2023    BCC- Left infraorbital   [4] No family history on file.

## 2025-07-02 NOTE — PLAN OF CARE
Problem: PAIN - ADULT  Goal: Verbalizes/displays adequate comfort level or baseline comfort level  Description: Interventions:  - Encourage patient to monitor pain and request assistance  - Assess pain using appropriate pain scale  - Administer analgesics as ordered based on type and severity of pain and evaluate response  - Implement non-pharmacological measures as appropriate and evaluate response  - Consider cultural and social influences on pain and pain management  - Notify physician/advanced practitioner if interventions unsuccessful or patient reports new pain  - Educate patient/family on pain management process including their role and importance of  reporting pain   - Provide non-pharmacologic/complimentary pain relief interventions  Outcome: Progressing     Problem: INFECTION - ADULT  Goal: Absence or prevention of progression during hospitalization  Description: INTERVENTIONS:  - Assess and monitor for signs and symptoms of infection  - Monitor lab/diagnostic results  - Monitor all insertion sites, i.e. indwelling lines, tubes, and drains  - Monitor endotracheal if appropriate and nasal secretions for changes in amount and color  - [place] appropriate cooling/warming therapies per order  - Administer medications as ordered  - Instruct and encourage patient and family to use good hand hygiene technique  - Identify and instruct in appropriate isolation precautions for identified infection/condition  Outcome: Progressing

## 2025-07-02 NOTE — CONSULTS
Consultation - Urology   Name: Cj Angelo 69 y.o. male I MRN: [id number]  Unit/Bed#: W -01 I Date of Admission: 7/2/2025   Date of Service: 7/2/2025 I Hospital Day: 0   Inpatient consult to Urology  Consult performed by: Silva Graves PA-C  Consult ordered by: Kristel Zavala DO        Physician Requesting Evaluation: Dragan Ruffin DO   Reason for Evaluation / Principal Problem: Ureteral calculus    Assessment & Plan  Obstructive Nephrolithasis  CT showing a 5 mm distal left ureteral stone just prior to the UVJ.  WBC 14  UA negative  Creatinine at baseline  Diagnosed with stone on 6/30--presenting due to uncontrolled pain      Plan:  IV fluids, antiemetics  Strain all urine  Flomax  Reassess for stone passage on 7/3.  If uncontrolled pain and no stone passage, patient is added on for ureteroscopy  N.p.o. at midnight  Elevated blood sugar          Subjective:   HPI: Cj is a 69-year-old male who presented to the ED with left-sided flank pain.  Patient reports pain started on Monday morning.  He was seen by [facility] on Monday which showed a 5 mm mid ureteral stone with mild hydronephrosis.  He was discharged with Tylenol, Percocet, Flomax.  Despite pain medication at home patient reports uncontrolled pain.  He reports associated symptoms of nausea and vomiting.  He denies any fever, chills, hematuria.  In the ED he had a white blood cell count of 14, UA unremarkable.  CT showing a 5 mm distal left ureteral stone just prior to the UVJ.  Currently patient is resting comfortably in bed reports his pain is currently controlled.  We discussed 5 mm ureteral stone, and possible intervention if no stone passage and continued pain tomorrow morning.  He will be n.p.o. at midnight.      Review of Systems   Constitutional:  Negative for chills and fever.   Respiratory:  Negative for cough and shortness of breath.    Cardiovascular:  Negative for chest pain and palpitations.    Gastrointestinal:  Positive for abdominal pain. Negative for vomiting.   Genitourinary:  Negative for dysuria and hematuria.   Musculoskeletal:  Positive for back pain. Negative for arthralgias.   Skin:  Negative for color change and rash.   Neurological:  Negative for seizures and syncope.   All other systems reviewed and are negative.      Objective:    Vitals: Blood pressure (!) 152/102, pulse 85, temperature 98.2 °F (36.8 °C), resp. rate 16, SpO2 96%.,There is no height or weight on file to calculate BMI.    Physical Exam  Constitutional:       General: He is not in acute distress.     Appearance: He is not ill-appearing or toxic-appearing.   HENT:      Head: Normocephalic and atraumatic.      Right Ear: External ear normal.      Left Ear: External ear normal.      Nose: Nose normal.      Mouth/Throat:      Mouth: Mucous membranes are moist.     Eyes:      General: No scleral icterus.     Conjunctiva/sclera: Conjunctivae normal.       Cardiovascular:      Rate and Rhythm: Normal rate.      Pulses: Normal pulses.   Pulmonary:      Effort: Pulmonary effort is normal.   Abdominal:      General: There is no distension.      Tenderness: There is no abdominal tenderness. There is no guarding.     Neurological:      General: No focal deficit present.      Mental Status: He is oriented to person, place, and time. Mental status is at baseline.     Psychiatric:         Mood and Affect: Mood normal.         Behavior: Behavior normal.         Thought Content: Thought content normal.         Judgment: Judgment normal.         Labs:  Recent Labs     07/02/25  0744   WBC 14.64*       Recent Labs     07/02/25  0744   HGB 15.5     Recent Labs     07/02/25  0744   HCT 47.4     Recent Labs     07/02/25  0744   CREATININE 1.21         History:  Past Medical History[1]  Social History[2]  Past Surgical History[3]  Family History[4]    Silva Graves PA-C  Date: 7/2/2025 Time: 1:23 PM          [1]   Past Medical  History:  Diagnosis Date    Basal cell carcinoma 08/15/2023    Left infraortbital   [2]   Social History  Socioeconomic History    Marital status: Unknown   Tobacco Use    Smoking status: Never    Smokeless tobacco: Never   Substance and Sexual Activity    Alcohol use: Not Currently    Drug use: Not Currently   [3]   Past Surgical History:  Procedure Laterality Date    MOHS SURGERY Left 08/15/2023    BCC- Left infraorbital   [4] No family history on file.

## 2025-07-02 NOTE — ED PROVIDER NOTES
Time reflects when diagnosis was documented in both MDM as applicable and the Disposition within this note       Time User Action Codes Description Comment    7/2/2025  8:57 AM Juan Bonilla Add [N13.9] Obstructive uropathy     7/2/2025  8:57 AM Juan Bonilla Add [R52] Intractable pain     7/2/2025  8:57 AM Juan Bonilla Add [R11.0] Nausea           ED Disposition       ED Disposition   Admit    Condition   Stable    Date/Time   Wed Jul 2, 2025  8:57 AM    Comment   --             Assessment & Plan       Medical Decision Making  This is a 69-year-old male patient presenting to the ED today for complaint of left flank pain.  He states that his pain started on Monday, and he was diagnosed with a 5 mm ureteral stone.  He has been taking Percocets, Zofran, without improvement in his symptoms.  On exam he has left-sided CVA tenderness, left suprapubic/inguinal tenderness.  He states he is unable to urinate.  Patient does appear to be in significant amount of pain.  His differential diagnosis includes: Ruptured calyx versus infection versus persistent stone versus other.  Patient had a bedside ultrasound showing persistent hydronephrosis on the left side.  Patient underwent a CT scan showing evidence for slightly moved, however lodged stone in the distal ureter.  With the patient's symptoms, I do think that he needs to be hospitalized.  I spoke with hospitalist about hospitalizing him onto their service and they agreed, requested urology be consulted.  I spoke with urology, confirmed that the patient is NPO.  Patient has received Toradol, Dilaudid for pain.  He has not urinated yet and I did not want to give him any antibiotics due to this.    Amount and/or Complexity of Data Reviewed  External Data Reviewed: labs, radiology, ECG and notes.  Labs: ordered. Decision-making details documented in ED Course.  Radiology: ordered.    Risk  Prescription drug management.  Decision regarding hospitalization.        ED Course as of  07/02/25 0958 Wed Jul 02, 2025   0809 WBC(!): 14.64   0809 Segmented %(!): 86       Medications   ketorolac (TORADOL) injection 15 mg (15 mg Intravenous Given 7/2/25 0817)   lactated ringers bolus 1,000 mL (1,000 mL Intravenous New Bag 7/2/25 0817)   iohexol (OMNIPAQUE) 350 MG/ML injection (MULTI-DOSE) 80 mL (80 mL Intravenous Given 7/2/25 0835)   HYDROmorphone (DILAUDID) injection 0.6 mg (0.6 mg Intravenous Given 7/2/25 0907)       ED Risk Strat Scores                    No data recorded                            History of Present Illness       Chief Complaint   Patient presents with    Abdominal Pain     Started with LLQ abdominal pain radiating to left flank on Monday at work and had CT scan done. Has 5mm kidney stone. Wife states he has more intake of fluids than output.        Past Medical History[1]   Past Surgical History[2]   Family History[3]   Social History[4]   E-Cigarette/Vaping      E-Cigarette/Vaping Substances    Nicotine No     THC No     CBD No     Flavoring No     Other No     Unknown No       I have reviewed and agree with the history as documented.     Is a 69-year-old male patient without any significantly related past medical history presenting to the ED today for complaint of left lower quadrant pain.  He was diagnosed with a stone in his left ureter, 5 mm at [facility] on Monday, when his pain started.  His pain is in the left lower quadrant, radiating to his left flank.  He states this pain is 4-5 out of 10 in intensity, increasing to 8 or 9 out of 10 in intensity.  Patient has nausea, without vomiting.  He also states that he has been constipated since Monday.  He has been taking Percocet for his pain.  Percocet has not been helping.        Review of Systems   Constitutional:  Negative for chills and fever.   HENT:  Negative for ear pain and sore throat.    Eyes:  Negative for pain and visual disturbance.   Respiratory:  Negative for cough and shortness of breath.     Cardiovascular:  Negative for chest pain and palpitations.   Gastrointestinal:  Positive for abdominal pain and nausea. Negative for vomiting.   Endocrine: Positive for polyuria.   Genitourinary:  Positive for dysuria and urgency. Negative for hematuria.   Musculoskeletal:  Negative for arthralgias and back pain.   Skin:  Negative for color change and rash.   Neurological:  Negative for seizures and syncope.   All other systems reviewed and are negative.          Objective       ED Triage Vitals   Temperature Pulse Blood Pressure Respirations SpO2 Patient Position - Orthostatic VS   07/02/25 0717 07/02/25 0717 07/02/25 0717 07/02/25 0717 07/02/25 0717 07/02/25 0717   98 °F (36.7 °C) 87 159/100 18 92 % Sitting      Temp Source Heart Rate Source BP Location FiO2 (%) Pain Score    07/02/25 0717 07/02/25 0717 07/02/25 0717 -- 07/02/25 0817    Oral Monitor Right arm  9      Vitals      Date and Time Temp Pulse SpO2 Resp BP Pain Score FACES Pain Rating User   07/02/25 0937 -- -- -- -- -- 1 -- AP   07/02/25 0909 -- 77 93 % 16 132/80 -- --    07/02/25 0907 -- -- -- -- -- 9 --    07/02/25 0820 -- 77 96 % 18 165/102 -- --    07/02/25 0817 -- -- -- -- -- 9 --    07/02/25 0717 98 °F (36.7 °C) 87 92 % 18 159/100 -- -- MD            Physical Exam  Vitals and nursing note reviewed.   Constitutional:       General: He is not in acute distress.     Appearance: Normal appearance. He is well-developed and normal weight. He is not ill-appearing.   HENT:      Head: Normocephalic and atraumatic.      Right Ear: External ear normal.      Left Ear: External ear normal.      Nose: Nose normal. No congestion or rhinorrhea.      Mouth/Throat:      Mouth: Mucous membranes are moist.      Pharynx: Oropharynx is clear.     Eyes:      General: No scleral icterus.     Conjunctiva/sclera: Conjunctivae normal.       Cardiovascular:      Rate and Rhythm: Normal rate and regular rhythm.      Pulses: Normal pulses.      Heart sounds: Normal  heart sounds. No murmur heard.  Pulmonary:      Effort: Pulmonary effort is normal. No respiratory distress.      Breath sounds: Normal breath sounds. No wheezing or rales.   Abdominal:      General: Abdomen is flat. Bowel sounds are normal. There is no distension.      Palpations: Abdomen is soft. There is no mass.      Tenderness: There is abdominal tenderness in the left lower quadrant. There is left CVA tenderness. There is no right CVA tenderness.     Musculoskeletal:         General: Normal range of motion.      Cervical back: Normal range of motion and neck supple. No tenderness.      Right lower leg: No edema.      Left lower leg: No edema.     Skin:     General: Skin is warm and dry.      Capillary Refill: Capillary refill takes less than 2 seconds.     Neurological:      General: No focal deficit present.      Mental Status: He is alert and oriented to person, place, and time. Mental status is at baseline.      Motor: No weakness.     Psychiatric:         Mood and Affect: Mood normal.         Behavior: Behavior normal.         Thought Content: Thought content normal.         Judgment: Judgment normal.         Results Reviewed       Procedure Component Value Units Date/Time    Comprehensive metabolic panel [[id number]]  (Abnormal) Collected: 07/02/25 0744    Lab Status: Final result Specimen: Blood from Arm, Right Updated: 07/02/25 0809     Sodium 134 mmol/L      Potassium 3.5 mmol/L      Chloride 103 mmol/L      CO2 25 mmol/L      ANION GAP 6 mmol/L      BUN 19 mg/dL      Creatinine 1.21 mg/dL      Glucose 183 mg/dL      Calcium 8.7 mg/dL      AST 14 U/L      ALT 13 U/L      Alkaline Phosphatase 62 U/L      Total Protein 7.0 g/dL      Albumin 3.8 g/dL      Total Bilirubin 0.65 mg/dL      eGFR 61 ml/min/1.73sq m     Narrative:      National Kid[employer] Disease Foundation guidelines for Chronic Kidney Disease (CKD):     Stage 1 with normal or high GFR (GFR > 90 mL/min/1.73 square meters)    Stage 2 Mild CKD (GFR =  60-89 mL/min/1.73 square meters)    Stage 3A Moderate CKD (GFR = 45-59 mL/min/1.73 square meters)    Stage 3B Moderate CKD (GFR = 30-44 mL/min/1.73 square meters)    Stage 4 Severe CKD (GFR = 15-29 mL/min/1.73 square meters)    Stage 5 End Stage CKD (GFR <15 mL/min/1.73 square meters)  Note: GFR calculation is accurate only with a steady state creatinine    CBC and differential [[id number]]  (Abnormal) Collected: 07/02/25 0744    Lab Status: Final result Specimen: Blood from Arm, Right Updated: 07/02/25 0753     WBC 14.64 Thousand/uL      RBC 5.07 Million/uL      Hemoglobin 15.5 g/dL      Hematocrit 47.4 %      MCV 94 fL      MCH 30.6 pg      MCHC 32.7 g/dL      RDW 13.9 %      MPV 9.6 fL      Platelets 265 Thousands/uL      nRBC 0 /100 WBCs      Segmented % 86 %      Immature Grans % 0 %      Lymphocytes % 4 %      Monocytes % 10 %      Eosinophils Relative 0 %      Basophils Relative 0 %      Absolute Neutrophils 12.43 Thousands/µL      Absolute Immature Grans 0.06 Thousand/uL      Absolute Lymphocytes 0.65 Thousands/µL      Absolute Monocytes 1.47 Thousand/µL      Eosinophils Absolute 0.01 Thousand/µL      Basophils Absolute 0.02 Thousands/µL     UA w Reflex to Microscopic w Reflex to Culture [[id number]]     Lab Status: No result Specimen: Urine             CT abdomen pelvis with contrast   Final Interpretation by Mateo Miles MD (07/02 0841)      5 mm distal left ureteral calculus at the vesicoureteral junction eliciting left renal inflammation and mild left hydroureteronephrosis.         Workstation performed: [id number]             POC Renal US    Date/Time: 7/2/2025 8:14 AM    Performed by: Juan Bonilla MD  Authorized by: Juan Bonilla MD    Patient location:  ED  Procedure details:     Exam Type:  Diagnostic    Indications: flank/back pain      Views obtained: bladder (transverse and sagittal), left kidney and right kidney      Image quality: diagnostic      Image availability:  Images  available in PACS and video obtained  Findings:     LEFT hydronephrosis: mild (grade 1)      RIGHT kidney findings: unremarkable      RIGHT hydronephrosis: none      Bladder:  Limited quality  Interpretation:     Renal ultrasound impressions: hydronephrosis        ED Medication and Procedure Management   None     Patient's Medications    No medications on file     No discharge procedures on file.  ED SEPSIS DOCUMENTATION   Time reflects when diagnosis was documented in both MDM as applicable and the Disposition within this note       Time User Action Codes Description Comment    7/2/2025  8:57 AM Juan Bonilla [N13.9] Obstructive uropathy     7/2/2025  8:57 AM Juan Bonilla Add [R52] Intractable pain     7/2/2025  8:57 AM Juan Bonilla Add [R11.0] Nausea                      [1]   Past Medical History:  Diagnosis Date    Basal cell carcinoma 08/15/2023    Left infraortbital   [2]   Past Surgical History:  Procedure Laterality Date    MOHS SURGERY Left 08/15/2023    BCC- Left infraorbital   [3] No family history on file.  [4]   Social History  Tobacco Use    Smoking status: Never    Smokeless tobacco: Never   Substance Use Topics    Alcohol use: Not Currently    Drug use: Not Currently        Juan Bonilla MD  07/02/25 0958

## 2025-07-02 NOTE — LETTER
[facility] SURGICAL  [address] [address] [facility] [address]  Dept: [phone]    July 6, 2025     Patient: Cj Angelo   YOB: 1955   Date of Visit: 7/2/2025       To Whom it May Concern:    Cj Angelo is under my professional care. He was seen in the hospital from 7/2/2025 to 07/06/25. He may return to work on after one week if he is cleared by his primary care provider  without limitations.    If you have any questions or concerns, please don't hesitate to call.         Sincerely,          Beth Trevino MD

## 2025-07-02 NOTE — H&P
H&P - Hospitalist   Name: Cj Angelo 69 y.o. male I MRN: [id number]  Unit/Bed#: W -01 I Date of Admission: 7/2/2025   Date of Service: 7/2/2025 I Hospital Day: 0     Assessment & Plan  Obstructive Nephrolithasis  -70yo male with no significant known past medical history presenting today for left-sided flank pain and being admitted for uncontrolled pain secondary to 5mm obstructive nephrolithiasis.   -Associated symptoms of nausea, vomiting, constipation, difficulty urinating.   -CT remarkable for 5 mm distal left ureteral calculus at the vesicoureteral junction eliciting left renal inflammation and mild left hydroureteronephrosis.  -UA remarkable for glucose, trace ketones, blood and protein.  -Recommend admission for pain control and OR for calculus removal.    Plan:  -Urology consulted - plan for OR 7/3/2025; NPO at midnight   -Bladder scan to assess to urinary retention   -125cc/hr NS  -Dilaudid 0.5mg q4 PRN  -Tylenol 650 q6 PRN  -Zofran 4mg q6 PRN  -Colace 100mg BID  -Senna 8.6mg daily           Elevated blood sugar  -A1c due to glucose in urine and elevated glucose     VTE Pharmacologic Prophylaxis: VTE Score: 2 Low Risk (Score 0-2) - Encourage Ambulation.  Code Status: Level 1 - Full Code   Discussion with family: Updated  (wife) at bedside.    Anticipated Length of Stay: Patient will be admitted on an observation basis with an anticipated length of stay of less than 2 midnights secondary to obstructive calculus. Plan for OR 7/3/2025.     History of Present Illness   Chief Complaint: Left-sided flank pain    Cj Angelo is a 69 y.o. male who presents with a two day history of left sided flank pain. Patient admits that pain began suddenly on Monday morning. At its worse, the pain is 9/10-10/10. Pain initiates below the left side of his umbilicus and radiates to the left side of his back. He was seen and evaluated at [facility] on Monday where CT showed left-sided  "hydroureteronephrosis. He pain improved slightly in the ED with Dilaudid. Patient was discharged with Tylenol, Percocet and Flomax and told to follow-up with urology. However, pain became unbearable which prompted patient to return to the the ED.     Of note, patient admits to nausea and vomiting. He has increased his fluid intake. He describes that he has urgency and frequency but is experiencing dysuria. No bowel movements since Monday.      In the ED, VSS. Labs notable for WBC of 14.64. UA remarkable for elevated glucose, trace ketones and small amount of blood. CT abdomen/pelvis revealed \"5 mm distal left ureteral calculus at the vesicoureteral junction eliciting left renal inflammation and mild left hydroureteronephrosis.\" He received Dilaudid, Toradol and LR. No abx at this time. Urology consulted and planning for OR tomorrow.     Review of Systems  Constitutional:  Negative for chills and fever.   Respiratory:  Negative for cough and shortness of breath.    Cardiovascular:  Negative for chest pain, palpitations and leg swelling.   Gastrointestinal:  Positive for abdominal pain, constipation, nausea and vomiting.        LLQ pain radiating to back   Endocrine: Negative for cold intolerance and heat intolerance.   Genitourinary:  Positive for decreased urine volume, difficulty urinating, flank pain and urgency.   Musculoskeletal:  Positive for back pain.   Skin:  Negative for rash and wound.   Neurological:  Negative for dizziness, numbness and headaches.   Psychiatric/Behavioral:  The patient is not nervous/anxious.      Historical Information   Past Medical History[1]  Past Surgical History[2]  Social History[3]  E-Cigarette/Vaping     E-Cigarette/Vaping Substances    Nicotine No     THC No     CBD No     Flavoring No     Other No     Unknown No        Meds/Allergies   I have reviewed home medications with patient personally.  Prior to Admission medications    Not on File     Allergies   Allergen Reactions    " Morphine Vomiting       Objective :  Temp:  [98 °F (36.7 °C)-98.2 °F (36.8 °C)] 98.2 °F (36.8 °C)  HR:  [74-87] 85  BP: (132-165)/() 152/102  Resp:  [16-18] 16  SpO2:  [92 %-98 %] 96 %  O2 Device: None (Room air)    Physical Exam   Constitutional:       General: He is not in acute distress.     Appearance: Normal appearance.   HENT:      Head: Normocephalic and atraumatic.      Mouth/Throat:      Mouth: Mucous membranes are dry.      Eyes:      General: No scleral icterus.     Pupils: Pupils are equal, round, and reactive to light.         Cardiovascular:      Rate and Rhythm: Normal rate and regular rhythm.      Pulses: Normal pulses.   Pulmonary:      Effort: Pulmonary effort is normal.      Breath sounds: Normal breath sounds. No wheezing.   Abdominal:      General: Abdomen is flat.      Palpations: Abdomen is soft.      Tenderness: There is abdominal tenderness.   Genitourinary:     Comments: Left CVA tenderness     Musculoskeletal:      Cervical back: Normal range of motion and neck supple.      Right lower leg: No edema.      Left lower leg: No edema.      Skin:     General: Skin is warm and dry.      Neurological:      General: No focal deficit present.      Mental Status: He is alert and oriented to person, place, and time.      Psychiatric:         Mood and Affect: Mood normal.         Behavior: Behavior normal.         Thought Content: Thought content normal.         Judgment: Judgment normal.     Lines/Drains:  Peripheral IV          Lab Results: I have reviewed the following results:  Results from last 7 days   Lab Units 07/02/25  0744   WBC Thousand/uL 14.64*   HEMOGLOBIN g/dL 15.5   HEMATOCRIT % 47.4   PLATELETS Thousands/uL 265   SEGS PCT % 86*   LYMPHO PCT % 4*   MONO PCT % 10   EOS PCT % 0     Results from last 7 days   Lab Units 07/02/25  0744   SODIUM mmol/L 134*   POTASSIUM mmol/L 3.5   CHLORIDE mmol/L 103   CO2 mmol/L 25   BUN mg/dL 19   CREATININE mg/dL 1.21   ANION GAP mmol/L 6   CALCIUM  "mg/dL 8.7   ALBUMIN g/dL 3.8   TOTAL BILIRUBIN mg/dL 0.65   ALK PHOS U/L 62   ALT U/L 13   AST U/L 14   GLUCOSE RANDOM mg/dL 183*          Latest Reference Range & Units 07/02/25 10:25   Color, UA  Yellow   Clarity, UA  Clear   SL AMB SPECIFIC GRAVITY_URINE 1.003 - 1.030  >=1.050 (H)   Glucose, UA Negative mg/dl 100 (1/10%) !   Ketones, UA Negative mg/dl Trace !   Blood, UA Negative  Small !   Nitrite, UA Negative  Negative   Leukocytes, UA Negative  Negative   pH, UA 4.5, 5.0, 5.5, 6.0, 6.5, 7.0, 7.5, 8.0  6.0   POCT URINE PROTEIN Negative mg/dl 30 (1+) !   Bilirubin Urine Negative  Negative   Urobilinogen, UA <2.0 mg/dl mg/dl <2.0   RBC Urine None Seen, 1-2 /hpf 1-2   WBC, UA None Seen, 1-2 /hpf 1-2   Epithelial Cells None Seen, Occasional /hpf Occasional   Bacteria, UA None Seen, Occasional /hpf None Seen   MUCUS THREADS None Seen  Innumerable !   (H): Data is abnormally high  !: Data is abnormal      No results found for: \"HGBA1C\"        Imaging Results Review: I reviewed radiology reports from this admission including: CT abdomen/pelvis.  CT ABDOMEN AND PELVIS WITH IV CONTRAST     INDICATION: abdominal pain,. .     COMPARISON: None.     TECHNIQUE: CT examination of the abdomen and pelvis was performed. Dual energy CT scan technique (DECT) was employed. Multiplanar 2D reformatted images were created from the source data.     This examination, like all CT scans performed in the [facility], was performed utilizing techniques to minimize radiation dose exposure, including the use of iterative reconstruction and automated exposure control. Radiation dose length   product (DLP) for this visit: 671 mGy-cm.     IV Contrast: 80 mL of iohexol (OMNIPAQUE)  Enteric Contrast: Not administered.     FINDINGS:     ABDOMEN     LOWER CHEST: Bibasilar linear atelectasis.     LIVER/BILIARY TREE: Unremarkable.     GALLBLADDER: Post cholecystectomy.     SPLEEN: Unremarkable.     PANCREAS: Unremarkable.     ADRENAL " GLANDS: Unremarkable.     KIDNEYS/URETERS: 5 mm distal left ureteral calculus at the vesicoureteral junction. Mild left hydroureteronephrosis. Left perinephric fat stranding. Nonobstructing 6 mm left renal calculus. No right hydroureteronephrosis.     STOMACH AND BOWEL: Unremarkable.     APPENDIX: Normal.     ABDOMINOPELVIC CAVITY: Trace amount of ascites in the pelvis. No rim-enhancing fluid collection. No pneumoperitoneum. No lymphadenopathy.     VESSELS: Atherosclerosis without abdominal aortic aneurysm.     PELVIS     REPRODUCTIVE ORGANS: Unremarkable for patient's age.     URINARY BLADDER: Unremarkable.     ABDOMINAL WALL/INGUINAL REGIONS: Postsurgical change. Small fat-containing umbilical hernia     BONES: No acute fracture or suspicious osseous lesion. Spinal degenerative changes.     IMPRESSION:     5 mm distal left ureteral calculus at the vesicoureteral junction eliciting left renal inflammation and mild left hydroureteronephrosis.        Other Study Results Review: EKG was reviewed.  NSR.    Administrative Statements   I have spent a total time of 60 minutes in caring for this patient on the day of the visit/encounter including Instructions for management, Patient and family education, Counseling / Coordination of care, Documenting in the medical record, Reviewing/placing orders in the medical record (including tests, medications, and/or procedures), and Obtaining or reviewing history  .    Electronically signed,  Kristel Zavala D.O.  Neurology Resident          [1]   Past Medical History:  Diagnosis Date    Basal cell carcinoma 08/15/2023    Left infraortbital   [2]   Past Surgical History:  Procedure Laterality Date    MOHS SURGERY Left 08/15/2023    BCC- Left infraorbital   [3]   Social History  Tobacco Use    Smoking status: Never    Smokeless tobacco: Never   Substance and Sexual Activity    Alcohol use: Not Currently    Drug use: Not Currently

## 2025-07-02 NOTE — ASSESSMENT & PLAN NOTE
CT showing a 5 mm distal left ureteral stone just prior to the UVJ.  WBC 14  UA negative  Creatinine at baseline  Diagnosed with stone on 6/30--presenting due to uncontrolled pain      Plan:  IV fluids, antiemetics  Strain all urine  Flomax  Reassess for stone passage on 7/3.  If uncontrolled pain and no stone passage, patient is added on for ureteroscopy  N.p.o. at midnight

## 2025-07-03 ENCOUNTER — APPOINTMENT (OUTPATIENT)
Dept: RADIOLOGY | Facility: HOSPITAL | Age: 70
DRG: 669 | End: 2025-07-03
Payer: COMMERCIAL

## 2025-07-03 ENCOUNTER — ANESTHESIA EVENT (OUTPATIENT)
Dept: PERIOP | Facility: HOSPITAL | Age: 70
DRG: 669 | End: 2025-07-03
Payer: COMMERCIAL

## 2025-07-03 ENCOUNTER — ANESTHESIA (OUTPATIENT)
Dept: PERIOP | Facility: HOSPITAL | Age: 70
DRG: 669 | End: 2025-07-03
Payer: COMMERCIAL

## 2025-07-03 PROBLEM — R73.03 PRE-DIABETES: Status: ACTIVE | Noted: 2025-07-02

## 2025-07-03 PROBLEM — E83.42 HYPOMAGNESEMIA: Status: ACTIVE | Noted: 2025-07-03

## 2025-07-03 PROBLEM — R03.0 ELEVATED BP WITHOUT DIAGNOSIS OF HYPERTENSION: Status: ACTIVE | Noted: 2025-07-03

## 2025-07-03 LAB
ANION GAP SERPL CALCULATED.3IONS-SCNC: 4 MMOL/L (ref 4–13)
BASOPHILS # BLD MANUAL: 0 THOUSAND/UL (ref 0–0.1)
BASOPHILS NFR MAR MANUAL: 0 % (ref 0–1)
BUN SERPL-MCNC: 18 MG/DL (ref 5–25)
CALCIUM SERPL-MCNC: 8.1 MG/DL (ref 8.4–10.2)
CHLORIDE SERPL-SCNC: 106 MMOL/L (ref 96–108)
CO2 SERPL-SCNC: 27 MMOL/L (ref 21–32)
CREAT SERPL-MCNC: 1.2 MG/DL (ref 0.6–1.3)
EOSINOPHIL # BLD MANUAL: 0 THOUSAND/UL (ref 0–0.4)
EOSINOPHIL NFR BLD MANUAL: 0 % (ref 0–6)
ERYTHROCYTE [DISTWIDTH] IN BLOOD BY AUTOMATED COUNT: 14.3 % (ref 11.6–15.1)
GFR SERPL CREATININE-BSD FRML MDRD: 62 ML/MIN/1.73SQ M
GLUCOSE P FAST SERPL-MCNC: 106 MG/DL (ref 65–99)
GLUCOSE SERPL-MCNC: 106 MG/DL (ref 65–140)
HCT VFR BLD AUTO: 40.9 % (ref 36.5–49.3)
HGB BLD-MCNC: 13.4 G/DL (ref 12–17)
LYMPHOCYTES # BLD AUTO: 1.24 THOUSAND/UL (ref 0.6–4.47)
LYMPHOCYTES # BLD AUTO: 9 % (ref 14–44)
MAGNESIUM SERPL-MCNC: 1.7 MG/DL (ref 1.9–2.7)
MCH RBC QN AUTO: 30.1 PG (ref 26.8–34.3)
MCHC RBC AUTO-ENTMCNC: 32.8 G/DL (ref 31.4–37.4)
MCV RBC AUTO: 92 FL (ref 82–98)
MONOCYTES # BLD AUTO: 2.2 THOUSAND/UL (ref 0–1.22)
MONOCYTES NFR BLD: 16 % (ref 4–12)
NEUTROPHILS # BLD MANUAL: 10.34 THOUSAND/UL (ref 1.85–7.62)
NEUTS SEG NFR BLD AUTO: 75 % (ref 43–75)
PLATELET # BLD AUTO: 244 THOUSANDS/UL (ref 149–390)
PLATELET BLD QL SMEAR: ADEQUATE
PMV BLD AUTO: 9.1 FL (ref 8.9–12.7)
POTASSIUM SERPL-SCNC: 3.9 MMOL/L (ref 3.5–5.3)
RBC # BLD AUTO: 4.45 MILLION/UL (ref 3.88–5.62)
RBC MORPH BLD: NORMAL
SODIUM SERPL-SCNC: 137 MMOL/L (ref 135–147)
WBC # BLD AUTO: 13.78 THOUSAND/UL (ref 4.31–10.16)

## 2025-07-03 PROCEDURE — 80048 BASIC METABOLIC PNL TOTAL CA: CPT | Performed by: INTERNAL MEDICINE

## 2025-07-03 PROCEDURE — 74420 UROGRAPHY RTRGR +-KUB: CPT

## 2025-07-03 PROCEDURE — 0TC78ZZ EXTIRPATION OF MATTER FROM LEFT URETER, VIA NATURAL OR ARTIFICIAL OPENING ENDOSCOPIC: ICD-10-PCS | Performed by: INTERNAL MEDICINE

## 2025-07-03 PROCEDURE — 52352 CYSTOURETERO W/STONE REMOVE: CPT | Performed by: STUDENT IN AN ORGANIZED HEALTH CARE EDUCATION/TRAINING PROGRAM

## 2025-07-03 PROCEDURE — 82360 CALCULUS ASSAY QUANT: CPT | Performed by: STUDENT IN AN ORGANIZED HEALTH CARE EDUCATION/TRAINING PROGRAM

## 2025-07-03 PROCEDURE — 99232 SBSQ HOSP IP/OBS MODERATE 35: CPT

## 2025-07-03 PROCEDURE — C2617 STENT, NON-COR, TEM W/O DEL: HCPCS | Performed by: STUDENT IN AN ORGANIZED HEALTH CARE EDUCATION/TRAINING PROGRAM

## 2025-07-03 PROCEDURE — C1758 CATHETER, URETERAL: HCPCS | Performed by: STUDENT IN AN ORGANIZED HEALTH CARE EDUCATION/TRAINING PROGRAM

## 2025-07-03 PROCEDURE — 85027 COMPLETE CBC AUTOMATED: CPT | Performed by: INTERNAL MEDICINE

## 2025-07-03 PROCEDURE — 52332 CYSTOSCOPY AND TREATMENT: CPT | Performed by: STUDENT IN AN ORGANIZED HEALTH CARE EDUCATION/TRAINING PROGRAM

## 2025-07-03 PROCEDURE — 83735 ASSAY OF MAGNESIUM: CPT | Performed by: INTERNAL MEDICINE

## 2025-07-03 PROCEDURE — 99232 SBSQ HOSP IP/OBS MODERATE 35: CPT | Performed by: INTERNAL MEDICINE

## 2025-07-03 PROCEDURE — 85007 BL SMEAR W/DIFF WBC COUNT: CPT | Performed by: INTERNAL MEDICINE

## 2025-07-03 PROCEDURE — BT1F1ZZ FLUOROSCOPY OF LEFT KIDNEY, URETER AND BLADDER USING LOW OSMOLAR CONTRAST: ICD-10-PCS | Performed by: INTERNAL MEDICINE

## 2025-07-03 PROCEDURE — C1769 GUIDE WIRE: HCPCS | Performed by: STUDENT IN AN ORGANIZED HEALTH CARE EDUCATION/TRAINING PROGRAM

## 2025-07-03 PROCEDURE — 0T778DZ DILATION OF LEFT URETER WITH INTRALUMINAL DEVICE, VIA NATURAL OR ARTIFICIAL OPENING ENDOSCOPIC: ICD-10-PCS | Performed by: INTERNAL MEDICINE

## 2025-07-03 DEVICE — VARIABLE LENGTH INJECTION STENT SET
Type: IMPLANTABLE DEVICE | Site: URETER | Status: FUNCTIONAL
Brand: CONTOUR VL™ INJECTION STENT SET

## 2025-07-03 RX ORDER — SODIUM CHLORIDE, SODIUM LACTATE, POTASSIUM CHLORIDE, CALCIUM CHLORIDE 600; 310; 30; 20 MG/100ML; MG/100ML; MG/100ML; MG/100ML
100 INJECTION, SOLUTION INTRAVENOUS CONTINUOUS
Status: DISCONTINUED | OUTPATIENT
Start: 2025-07-03 | End: 2025-07-03

## 2025-07-03 RX ORDER — ACETAMINOPHEN 325 MG/1
650 TABLET ORAL EVERY 6 HOURS PRN
Status: DISCONTINUED | OUTPATIENT
Start: 2025-07-03 | End: 2025-07-06 | Stop reason: HOSPADM

## 2025-07-03 RX ORDER — HYDROMORPHONE HCL/PF 1 MG/ML
0.5 SYRINGE (ML) INJECTION ONCE
Status: COMPLETED | OUTPATIENT
Start: 2025-07-03 | End: 2025-07-03

## 2025-07-03 RX ORDER — OXYCODONE HYDROCHLORIDE 5 MG/1
5 TABLET ORAL EVERY 4 HOURS PRN
Refills: 0 | Status: DISCONTINUED | OUTPATIENT
Start: 2025-07-03 | End: 2025-07-03

## 2025-07-03 RX ORDER — CEFAZOLIN SODIUM 1 G/50ML
1000 SOLUTION INTRAVENOUS EVERY 8 HOURS
Status: COMPLETED | OUTPATIENT
Start: 2025-07-04 | End: 2025-07-05

## 2025-07-03 RX ORDER — KETOROLAC TROMETHAMINE 30 MG/ML
INJECTION, SOLUTION INTRAMUSCULAR; INTRAVENOUS AS NEEDED
Status: DISCONTINUED | OUTPATIENT
Start: 2025-07-03 | End: 2025-07-03

## 2025-07-03 RX ORDER — POLYETHYLENE GLYCOL 3350 17 G/17G
17 POWDER, FOR SOLUTION ORAL DAILY PRN
Status: DISCONTINUED | OUTPATIENT
Start: 2025-07-03 | End: 2025-07-03

## 2025-07-03 RX ORDER — MAGNESIUM HYDROXIDE 1200 MG/15ML
LIQUID ORAL AS NEEDED
Status: DISCONTINUED | OUTPATIENT
Start: 2025-07-03 | End: 2025-07-03 | Stop reason: HOSPADM

## 2025-07-03 RX ORDER — HYOSCYAMINE SULFATE 0.12 MG/1
0.12 TABLET SUBLINGUAL EVERY 4 HOURS PRN
Status: DISCONTINUED | OUTPATIENT
Start: 2025-07-03 | End: 2025-07-06 | Stop reason: HOSPADM

## 2025-07-03 RX ORDER — FENTANYL CITRATE/PF 50 MCG/ML
25 SYRINGE (ML) INJECTION
Status: DISCONTINUED | OUTPATIENT
Start: 2025-07-03 | End: 2025-07-03

## 2025-07-03 RX ORDER — PHENAZOPYRIDINE HYDROCHLORIDE 100 MG/1
100 TABLET, FILM COATED ORAL
Status: DISCONTINUED | OUTPATIENT
Start: 2025-07-03 | End: 2025-07-05

## 2025-07-03 RX ORDER — PROPOFOL 10 MG/ML
INJECTION, EMULSION INTRAVENOUS AS NEEDED
Status: DISCONTINUED | OUTPATIENT
Start: 2025-07-03 | End: 2025-07-03

## 2025-07-03 RX ORDER — MAGNESIUM SULFATE 1 G/100ML
1 INJECTION INTRAVENOUS ONCE
Status: COMPLETED | OUTPATIENT
Start: 2025-07-03 | End: 2025-07-03

## 2025-07-03 RX ORDER — POLYETHYLENE GLYCOL 3350 17 G/17G
17 POWDER, FOR SOLUTION ORAL DAILY
Status: DISCONTINUED | OUTPATIENT
Start: 2025-07-03 | End: 2025-07-06 | Stop reason: HOSPADM

## 2025-07-03 RX ORDER — LIDOCAINE HYDROCHLORIDE 10 MG/ML
INJECTION, SOLUTION EPIDURAL; INFILTRATION; INTRACAUDAL; PERINEURAL AS NEEDED
Status: DISCONTINUED | OUTPATIENT
Start: 2025-07-03 | End: 2025-07-03

## 2025-07-03 RX ORDER — HYDROMORPHONE HCL/PF 1 MG/ML
0.5 SYRINGE (ML) INJECTION EVERY 4 HOURS PRN
Status: DISCONTINUED | OUTPATIENT
Start: 2025-07-03 | End: 2025-07-06 | Stop reason: HOSPADM

## 2025-07-03 RX ORDER — OXYCODONE HYDROCHLORIDE 5 MG/1
5 TABLET ORAL EVERY 4 HOURS PRN
Refills: 0 | Status: DISCONTINUED | OUTPATIENT
Start: 2025-07-03 | End: 2025-07-06 | Stop reason: HOSPADM

## 2025-07-03 RX ORDER — SODIUM CHLORIDE, SODIUM LACTATE, POTASSIUM CHLORIDE, CALCIUM CHLORIDE 600; 310; 30; 20 MG/100ML; MG/100ML; MG/100ML; MG/100ML
INJECTION, SOLUTION INTRAVENOUS CONTINUOUS PRN
Status: DISCONTINUED | OUTPATIENT
Start: 2025-07-03 | End: 2025-07-03

## 2025-07-03 RX ORDER — CEFAZOLIN SODIUM 2 G/50ML
2000 SOLUTION INTRAVENOUS ONCE
Status: COMPLETED | OUTPATIENT
Start: 2025-07-03 | End: 2025-07-03

## 2025-07-03 RX ORDER — HYDROMORPHONE HCL IN WATER/PF 6 MG/30 ML
0.2 PATIENT CONTROLLED ANALGESIA SYRINGE INTRAVENOUS
Status: DISCONTINUED | OUTPATIENT
Start: 2025-07-03 | End: 2025-07-03

## 2025-07-03 RX ORDER — DEXAMETHASONE SODIUM PHOSPHATE 10 MG/ML
INJECTION, SOLUTION INTRAMUSCULAR; INTRAVENOUS AS NEEDED
Status: DISCONTINUED | OUTPATIENT
Start: 2025-07-03 | End: 2025-07-03

## 2025-07-03 RX ORDER — ONDANSETRON 2 MG/ML
4 INJECTION INTRAMUSCULAR; INTRAVENOUS ONCE AS NEEDED
Status: DISCONTINUED | OUTPATIENT
Start: 2025-07-03 | End: 2025-07-03

## 2025-07-03 RX ORDER — ONDANSETRON 2 MG/ML
INJECTION INTRAMUSCULAR; INTRAVENOUS AS NEEDED
Status: DISCONTINUED | OUTPATIENT
Start: 2025-07-03 | End: 2025-07-03

## 2025-07-03 RX ORDER — FENTANYL CITRATE 50 UG/ML
INJECTION, SOLUTION INTRAMUSCULAR; INTRAVENOUS AS NEEDED
Status: DISCONTINUED | OUTPATIENT
Start: 2025-07-03 | End: 2025-07-03

## 2025-07-03 RX ORDER — MIDAZOLAM HYDROCHLORIDE 2 MG/2ML
INJECTION, SOLUTION INTRAMUSCULAR; INTRAVENOUS AS NEEDED
Status: DISCONTINUED | OUTPATIENT
Start: 2025-07-03 | End: 2025-07-03

## 2025-07-03 RX ADMIN — ONDANSETRON 4 MG: 2 INJECTION INTRAMUSCULAR; INTRAVENOUS at 16:03

## 2025-07-03 RX ADMIN — CEFAZOLIN SODIUM 2000 MG: 2 SOLUTION INTRAVENOUS at 16:05

## 2025-07-03 RX ADMIN — SENNOSIDES 8.6 MG: 8.6 TABLET, FILM COATED ORAL at 09:48

## 2025-07-03 RX ADMIN — FENTANYL CITRATE 25 MCG: 50 INJECTION INTRAMUSCULAR; INTRAVENOUS at 16:41

## 2025-07-03 RX ADMIN — CEFAZOLIN SODIUM 1000 MG: 1 SOLUTION INTRAVENOUS at 23:19

## 2025-07-03 RX ADMIN — SODIUM CHLORIDE, SODIUM LACTATE, POTASSIUM CHLORIDE, AND CALCIUM CHLORIDE: .6; .31; .03; .02 INJECTION, SOLUTION INTRAVENOUS at 15:58

## 2025-07-03 RX ADMIN — ACETAMINOPHEN 650 MG: 325 TABLET ORAL at 00:38

## 2025-07-03 RX ADMIN — HYDROMORPHONE HYDROCHLORIDE 0.5 MG: 1 INJECTION, SOLUTION INTRAMUSCULAR; INTRAVENOUS; SUBCUTANEOUS at 00:31

## 2025-07-03 RX ADMIN — PHENAZOPYRIDINE 100 MG: 100 TABLET ORAL at 18:49

## 2025-07-03 RX ADMIN — MIDAZOLAM 2 MG: 1 INJECTION INTRAMUSCULAR; INTRAVENOUS at 15:58

## 2025-07-03 RX ADMIN — DEXAMETHASONE SODIUM PHOSPHATE 10 MG: 10 INJECTION INTRAMUSCULAR; INTRAVENOUS at 16:03

## 2025-07-03 RX ADMIN — PROPOFOL 200 MG: 10 INJECTION, EMULSION INTRAVENOUS at 16:03

## 2025-07-03 RX ADMIN — DOCUSATE SODIUM 100 MG: 100 CAPSULE, LIQUID FILLED ORAL at 09:48

## 2025-07-03 RX ADMIN — FENTANYL CITRATE 50 MCG: 50 INJECTION INTRAMUSCULAR; INTRAVENOUS at 16:44

## 2025-07-03 RX ADMIN — HYDROMORPHONE HYDROCHLORIDE 0.5 MG: 1 INJECTION, SOLUTION INTRAMUSCULAR; INTRAVENOUS; SUBCUTANEOUS at 07:39

## 2025-07-03 RX ADMIN — DOCUSATE SODIUM 100 MG: 100 CAPSULE, LIQUID FILLED ORAL at 18:49

## 2025-07-03 RX ADMIN — MAGNESIUM SULFATE 1 G: 1 INJECTION INTRAVENOUS at 05:55

## 2025-07-03 RX ADMIN — SODIUM CHLORIDE 125 ML/HR: 0.9 INJECTION, SOLUTION INTRAVENOUS at 05:58

## 2025-07-03 RX ADMIN — LIDOCAINE HYDROCHLORIDE 50 MG: 10 INJECTION, SOLUTION EPIDURAL; INFILTRATION; INTRACAUDAL at 16:03

## 2025-07-03 RX ADMIN — FENTANYL CITRATE 25 MCG: 50 INJECTION INTRAMUSCULAR; INTRAVENOUS at 16:22

## 2025-07-03 RX ADMIN — KETOROLAC TROMETHAMINE 15 MG: 30 INJECTION, SOLUTION INTRAMUSCULAR; INTRAVENOUS at 16:50

## 2025-07-03 RX ADMIN — SODIUM CHLORIDE 125 ML/HR: 0.9 INJECTION, SOLUTION INTRAVENOUS at 22:37

## 2025-07-03 RX ADMIN — HYDROMORPHONE HYDROCHLORIDE 0.5 MG: 1 INJECTION, SOLUTION INTRAMUSCULAR; INTRAVENOUS; SUBCUTANEOUS at 10:13

## 2025-07-03 NOTE — PLAN OF CARE
Problem: PAIN - ADULT  Goal: Verbalizes/displays adequate comfort level or baseline comfort level  Description: Interventions:  - Encourage patient to monitor pain and request assistance  - Assess pain using appropriate pain scale  - Administer analgesics as ordered based on type and severity of pain and evaluate response  - Implement non-pharmacological measures as appropriate and evaluate response  - Consider cultural and social influences on pain and pain management  - Notify physician/advanced practitioner if interventions unsuccessful or patient reports new pain  - Educate patient/family on pain management process including their role and importance of  reporting pain   - Provide non-pharmacologic/complimentary pain relief interventions  Outcome: Progressing     Problem: INFECTION - ADULT  Goal: Absence or prevention of progression during hospitalization  Description: INTERVENTIONS:  - Assess and monitor for signs and symptoms of infection  - Monitor lab/diagnostic results  - Monitor all insertion sites, i.e. indwelling lines, tubes, and drains  - Monitor endotracheal if appropriate and nasal secretions for changes in amount and color  - [place] appropriate cooling/warming therapies per order  - Administer medications as ordered  - Instruct and encourage patient and family to use good hand hygiene technique  - Identify and instruct in appropriate isolation precautions for identified infection/condition  Outcome: Progressing

## 2025-07-03 NOTE — ANESTHESIA PREPROCEDURE EVALUATION
Procedure:  CYSTOSCOPY URETEROSCOPY WITH LITHOTRIPSY HOLMIUM LASER, RETROGRADE PYELOGRAM AND INSERTION STENT URETERAL (Left: Bladder)    Relevant Problems   /RENAL   (+) Obstructive Nephrolithasis      Cr 1.2  Hgb 13.4, plt 244  Physical Exam    Airway     Mallampati score: II          Cardiovascular      Dental       Pulmonary      Neurological      Other Findings        Anesthesia Plan  ASA Score- 2     Anesthesia Type- general with ASA Monitors.         Additional Monitors:     Airway Plan: LMA and LMA.    Comment: General anesthesia, LMA; standard ASA monitors. Risks and benefits discussed with patient; patient consented and agrees to proceed.    I saw and evaluated the patient. If seen with CRNA, we have discussed the anesthetic plan and I am in agreement that the plan is appropriate for the patient.  .       Plan Factors-    Chart reviewed.   Existing labs reviewed.                   Induction-     Postoperative Plan- .   Monitoring Plan - Monitoring plan - standard ASA monitoring      Perioperative Resuscitation Plan - Level 1 - Full Code.       Informed Consent- Anesthetic plan and risks discussed with patient.  I personally reviewed this patient with the CRNA. Discussed and agreed on the Anesthesia Plan with the CRNA..      NPO Status:  No vitals data found for the desired time range.

## 2025-07-03 NOTE — ASSESSMENT & PLAN NOTE
CT showing a 5 mm distal left ureteral stone just prior to the UVJ.  WBC 13  UA negative  Creatinine at baseline  Diagnosed with stone on 6/30--representing due to uncontrolled pain  Low grade 100.3F yesterday afternoon   Continued uncontrolled pain overnight  No stone passage      Plan:  Discussed with patient cystoscopy, L ureteroscopy, holmium laser lithotripsy, basket stone extraction, retrograde pyelogram, insertion of L ureteral stent.  Discussed risk associated with procedure including risk with anesthesia, bleeding, infection, damage to kidneys, ureter, bladder, inability to treat stone, ureteral stricture, need for delayed ureteroscopy for any signs of infection.  Discussed stent colic including frequency, urgency, hematuria, flank pain.  Consent signed and placed in preop holding  Possible discharge later today, pending recovery and OR findings

## 2025-07-03 NOTE — ASSESSMENT & PLAN NOTE
Caller: [facility]    Relationship:     Best call back number: [phone]    What is the best time to reach you: ASAP    Who are you requesting to speak with (clinical staff, provider,  specific staff member): NURSE    Do you know the name of the person who called:     What was the call regarding: [facility] SAYS THEY NEED  A NEW DX CODE FOR BRA    Do you require a callback: YES   -69 years old male with no significant known past medical history presented yesterday 7/2 with left-sided flank pain and associated symptoms of nausea, vomiting, constipation, difficulty urinating.  Patient is admitted due to uncontrolled pain.  -  Patient presented to [facility] on Monday 6/30 with sharp left flank pain radiating into the lower abdominal with nausea and vomiting.  Patient denied hematuria, fever, chills, history of kidney stones.  Labs reviewed patient did not have leukocytosis or hyperglycemia.  UA showed large blood amount in the urine, proteinuria, trace leukocyte esterase. CT abdomen pelvis w contrast showed:Moderate left-sided hydroureteronephrosis with left perinephric free fluid and induration.  Patient was discharged with pain medications and was told to follow-up with urology.  -7/2 CT abdomen pelvis with contrast remarkable for 5 mm distal left ureteral calculus at the vesicoureteral junction eliciting left renal inflammation and mild left hydroureteronephrosis.  - 7/2: UA remarkable for glucose, trace ketones, blood and protein.  - In the ED was given lactated Ringer bolus, Dilaudid, Toradol.  - 7/3:  CYSTOSCOPY URETEROSCOPY WITH RETROGRADE PYELOGRAM AND INSERTION STENT URETERAL, STONE BASKET EXTRACTION (Left: Bladder). Stable post-op    Plan:  - OR 7/3 for cystoscopy and stenting  - Appreciate urology's input: ill do void trial on 7/4, abx  -Dilaudid 0.5mg q4 PRN for severe pain  - Oxycodone 5 mg for moderate pain  -Tylenol 650 q6 PRN  -Zofran 4mg q6 PRN  -Colace 100mg BID  -Senna 8.6mg daily  -MiraLAX 17 G PACKET

## 2025-07-03 NOTE — OP NOTE
OPERATIVE REPORT  PATIENT NAME: Cj Angelo    :  1955  MRN: [id number]  Pt Location: AN OR ROOM 01    SURGERY DATE: 7/3/2025    Surgeons and Role:     * Kumar Duarte DO - Primary    Preop Diagnosis:  Obstructive uropathy N13.9    Post-Op Diagnosis Codes:     * Obstructive uropathy [N13.9]    Procedure(s):  Left - CYSTOSCOPY URETEROSCOPY WITH LITHOTRIPSY HOLMIUM LASER. RETROGRADE PYELOGRAM AND INSERTION STENT URETERAL    Specimen(s):  * No specimens in log *    Estimated Blood Loss:   Minimal    Drains:  * No LDAs found *    Anesthesia Type:   General    Operative Indications: 69-year-old male presenting with left flank pain that began 4 days ago with intractable pain.  Benefits and alternatives were discussed patient elected to proceed to the operating room    Obstructive uropathy N13.9      Operative Findings:  Trilobar prostatic hypertrophy.  Large capacity bladder mild trabeculations.  Anteriorly displaced ureteral orifices close to the bladder neck.  Distal ureteral stone basketed with ease.  Moderate hydronephrosis on retrograde pyelogram       Complications:   None    Procedure and Technique:    After informed consent was obtained the patient was brought back to the operating room and induced with general LMA anethesia.  Patient was positioned in the dorsal lithotomy position with all pressure points appropriately padded.  He was then draped in normal sterile fashion.  Surgical timeout was performed identifying the patient procedure and side.  Patient already received antibiotics in the ER.     Cystoscope was placed in the bladder.  Bladder mucosa was grossly normal without any tumors or lesions noted. Ureteral orifices were in normal ortho topic position.  Urine appeared nonpurulent and he was decided to perform a ureteroscopy.  Left ureteral orifice was cannulated with an open-ended 5 [place] catheter and a sensor wire was advanced to the left renal pelvis and confirmed on fluoroscopy.   Cystoscope was removed leaving the wire in place and the wire was secured to the drape.  Semirigid ureteroscope was then inserted into the bladder and ureteroscopy was performed to the level of the distal ureter where the stone was encountered.  The stone appeared small enough to remove without laser lithotripsy and was therefore basketed and removed with ease and sent for analysis.  Ureteroscopy was performed to the proximal ureter no other fragments were noted.  Contrast was injected through the scope and pyelogram performed revealing no obvious filling defects in the renal pelvis.  Scope was then removed.  Cystoscope was reinserted over the safety wire.  4.8 Multilength stent was advanced over the sensor wire and deployed in the left ureter under fluoroscopy.  Good proximal and distal curls were observed.  This marks the end of the procedure the bladder was then drained and cystoscope removed.  Urine was noted to be mildly bloody from the procedure therefore Low catheter was placed with good urine return.    Patient was returned to supine position awoken from anesthesia and transferred to PACU in stable condition.       I was present for the entire procedure.    Patient Disposition:  PACU     SIGNATURE: Kumar Duarte,   DATE: July 3, 2025  TIME: 3:54 PM

## 2025-07-03 NOTE — INTERVAL H&P NOTE
H&P reviewed. After examining the patient I find no changes in the patients condition since the H&P had been written.    Vitals:    07/03/25 1510   BP: (!) 168/110   Pulse: 88   Resp: 20   Temp: 100.4 °F (38 °C)   SpO2: 97%

## 2025-07-03 NOTE — ASSESSMENT & PLAN NOTE
-A1c due to glucose in urine and elevated glucose of 183 on 7/2 on admission  - Hemoglobin A1c is 6 on 7/2. Prediabetic

## 2025-07-03 NOTE — UTILIZATION REVIEW
Initial Clinical Review    Admission: Date/Time/Statement:   Admission Orders (From admission, onward)       Ordered        07/02/25 0858  Place in Observation  Once                   Outpatient No Charge Bed  Once        Transfer Service: Hospitalist    07/03/25 1543        Orders Placed This Encounter   Procedures    Place in Observation     Standing Status:   Standing     Number of Occurrences:   1     Level of Care:   Med Surg [16]     ED Arrival Information       Expected   -    Arrival   7/2/2025 07:01    Acuity   Urgent              Means of arrival   Wheelchair    Escorted by   Family Member    Service   Hospitalist    Admission type   Emergency              Arrival complaint   lower abd pain/kidney stone             Chief Complaint   Patient presents with    Abdominal Pain     Started with LLQ abdominal pain radiating to left flank on Monday at work and had CT scan done. Has 5mm kidney stone. Wife states he has more intake of fluids than output.        Initial Presentation: 69 y.o. male with hx BCC who presents to ED from home with 2 days of L flank pain, now worsening  . Pain initiates below the left side of his umbilicus and radiates to the left side of his back. Associated nausea and vomiting  and reports urinary urgency and frequency .  He was seen and evaluated at [facility] on Monday where CT showed left-sided hydroureteronephrosis- pt received IV analgesics at that time and was d/c'ed to home w/  Tylenol, Percocet and Flomax and told to follow-up with urology . On exam, abdominl tenderness. L CVA tenderness. Dry mucous membranes .    Labs WBC 14.64 . UA shows r elevated glucose, trace ketones and small amount of blood. CT A/P :5 mm distal left ureteral calculus at the vesicoureteral junction eliciting left renal inflammation and mild left hydroureteronephrosis . Pt given IVF, Iv analgesics in ED. Admitted as OBS with obstructive nephrolithiasis . Plan- urology consult, pain control.  IVF . Blaadder scan . Bowel regime . Npo after MN for MORALES 7/3 . Obtain A1c.   Anticipated Length of Stay/Certification Statement: Patient will be admitted on an observation basis with an anticipated length of stay of less than 2 midnights secondary to obstructive calculus. Plan for OR 7/3/2025.    Urology consult - 5 mm left UVJ calculus . Recommend medical expulsive therapy overnight w/ IVF , Flomax , pain control. NPO after MN .  If pt  continues to have pain July 3  ,  will plan for cystoscopy with left ureteroscopy with holmium laser lithotripsy, left retrograde pyelography and left ureteral stent insertion .    Date: 7/3    Pt had  temp 100.3 F yesterday afternoon , WBC down to 13.78 today . Pt with continued with uncontrolled pain overnight . No stone passage . Currently rates pain 7/10- receiving prn IV Dilaudid . IVF infusing .Creat 1.20 .  Plan for OR today @ approx 1555. .     ED Treatment-Medication Administration from 07/02/2025 0701 to 07/02/2025 1132         Date/Time Order Dose Route Action     07/02/2025 0817 ketorolac (TORADOL) injection 15 mg 15 mg Intravenous Given     07/02/2025 0817 lactated ringers bolus 1,000 mL 1,000 mL Intravenous New Bag     07/02/2025 0835 iohexol (OMNIPAQUE) 350 MG/ML injection (MULTI-DOSE) 80 mL 80 mL Intravenous Given     07/02/2025 0907 HYDROmorphone (DILAUDID) injection 0.6 mg 0.6 mg Intravenous Given            Scheduled Medications:  ceFAZolin, 2,000 mg, Intravenous, Once  docusate sodium, 100 mg, Oral, BID  polyethylene glycol, 17 g, Oral, Daily  senna, 1 tablet, Oral, Daily  tamsulosin, 0.4 mg, Oral, Daily With Dinner      magnesium sulfate IVPB (premix) SOLN 1 g  Dose: 1 g  Freq: Once Route: IV  Last Dose: Stopped (07/03/25 0707)  Start: 07/03/25 0430 End: 07/03/25 0707  HYDROmorphone (DILAUDID) injection 0.5 mg  Dose: 0.5 mg  Freq: Once Route: IV  Start: 07/03/25 1015 End: 07/03/25 1013  Continuous IV Infusions:  sodium chloride, 125 mL/hr, Intravenous,  Continuous      PRN Meds:  acetaminophen, 650 mg, Oral, Q6H PRN x1 7/3   HYDROmorphone, 0.5 mg, Intravenous, Q4H PRN x1 7/2, x3 7/3   ondansetron, 4 mg, Intravenous, Q6H PRN      ED Triage Vitals   Temperature Pulse Respirations Blood Pressure SpO2 Pain Score   07/02/25 0717 07/02/25 0717 07/02/25 0717 07/02/25 0717 07/02/25 0717 07/02/25 0817   98 °F (36.7 °C) 87 18 159/100 92 % 9     Weight (last 2 days)       Date/Time Weight    07/02/25 1700 86.2 (190)            Vital Signs (last 3 days)       Date/Time Temp Pulse Resp BP MAP (mmHg) SpO2 O2 Device Patient Position - Orthostatic VS Pain    07/03/25 1013 -- -- -- -- -- -- -- -- 10 - Worst Possible Pain    07/03/25 1012 -- -- -- -- -- -- -- -- 10 - Worst Possible Pain    07/03/25 0739 -- -- -- -- -- -- -- -- 10 - Worst Possible Pain    07/03/25 07:24:02 97.3 °F (36.3 °C) 75 20 168/95 119 95 % -- -- --    07/03/25 0038 -- -- -- -- -- -- -- -- 7 07/03/25 0031 -- -- -- -- -- -- -- -- 7 07/02/25 22:36:34 98.8 °F (37.1 °C) 79 -- 132/81 98 93 % -- -- --    07/02/25 2000 -- -- -- -- -- -- None (Room air) -- 5    07/02/25 1850 -- -- -- -- -- -- -- -- 8    07/02/25 14:45:22 100.3 °F (37.9 °C) 88 -- 132/88 103 93 % -- -- --    07/02/25 14:45:03 100.3 °F (37.9 °C) 89 -- 132/88 103 95 % -- -- --    07/02/25 1200 -- -- -- -- -- -- -- -- 4    07/02/25 11:35:21 98.2 °F (36.8 °C) 85 -- 152/102 119 96 % -- -- --    07/02/25 1122 -- 74 16 149/82 -- 98 % None (Room air) Lying 9    07/02/25 0937 -- -- -- -- -- -- -- -- 1    07/02/25 0909 -- 77 16 132/80 101 93 % -- -- --    07/02/25 0907 -- -- -- -- -- -- -- -- 9    07/02/25 0820 -- 77 18 165/102 128 96 % -- -- --    07/02/25 0817 -- -- -- -- -- -- -- -- 9 07/02/25 0717 98 °F (36.7 °C) 87 18 159/100 124 92 % None (Room air) Sitting --              Pertinent Labs/Diagnostic Test Results:   Radiology:  CT abdomen pelvis with contrast   Final Interpretation by Mateo Miles MD (07/02 0841)      5 mm distal left ureteral  calculus at the vesicoureteral junction eliciting left renal inflammation and mild left hydroureteronephrosis.         Workstation performed: [id number]           Cardiology:  ECG 12 lead   Final Result by Terrence Luong MD (07/02 0924)   Sinus rhythm with occasional Premature ventricular complexes   Otherwise normal ECG   No previous ECGs available   Confirmed by Terrence Luong ([id number]) on 7/2/2025 9:24:19 AM                Results from last 7 days   Lab Units 07/03/25  0343 07/02/25  0744   WBC Thousand/uL 13.78* 14.64*   HEMOGLOBIN g/dL 13.4 15.5   HEMATOCRIT % 40.9 47.4   PLATELETS Thousands/uL 244 265   TOTAL NEUT ABS Thousands/µL  --  12.43*         Results from last 7 days   Lab Units 07/03/25  0343 07/02/25  0744   SODIUM mmol/L 137 134*   POTASSIUM mmol/L 3.9 3.5   CHLORIDE mmol/L 106 103   CO2 mmol/L 27 25   ANION GAP mmol/L 4 6   BUN mg/dL 18 19   CREATININE mg/dL 1.20 1.21   EGFR ml/min/1.73sq m 62 61   CALCIUM mg/dL 8.1* 8.7   MAGNESIUM mg/dL 1.7*  --      Results from last 7 days   Lab Units 07/02/25  0744   AST U/L 14   ALT U/L 13   ALK PHOS U/L 62   TOTAL PROTEIN g/dL 7.0   ALBUMIN g/dL 3.8   TOTAL BILIRUBIN mg/dL 0.65         Results from last 7 days   Lab Units 07/03/25  0343 07/02/25  0744   GLUCOSE RANDOM mg/dL 106 183*         Results from last 7 days   Lab Units 07/02/25  0744   HEMOGLOBIN A1C % 6.0*   EAG mg/dl 126               Results from last 7 days   Lab Units 07/02/25  1025   CLARITY UA  Clear   COLOR UA  Yellow   SPEC GRAV UA  >=1.050*   PH UA  6.0   GLUCOSE UA mg/dl 100 (1/10%)*   KETONES UA mg/dl Trace*   BLOOD UA  Small*   PROTEIN UA mg/dl 30 (1+)*   NITRITE UA  Negative   BILIRUBIN UA  Negative   UROBILINOGEN UA (BE) mg/dl <2.0   LEUKOCYTES UA  Negative   WBC UA /hpf 1-2   RBC UA /hpf 1-2   BACTERIA UA /hpf None Seen   EPITHELIAL CELLS WET PREP /hpf Occasional   MUCUS THREADS  Innumerable*                         Past Medical History[1]  Present on Admission:   Pre-diabetes    Elevated BP without diagnosis of hypertension      Admitting Diagnosis: Nausea [R11.0]  Abdominal pain [R10.9]  Obstructive uropathy [N13.9]  Intractable pain [R52]  Age/Sex: 69 y.o. male    Network Utilization Review Department  ATTENTION: Please call with any questions or concerns to [phone] and carefully listen to the prompts so that you are directed to the right person. All voicemails are confidential.   For Discharge needs, contact Care Management DC Support Team at [phone] opt. 2  Send all requests for admission clinical reviews, approved or denied determinations and any other requests to dedicated fax number below belonging to the campus where the patient is receiving treatment. List of dedicated fax numbers for the Facilities:  FACILITY NAME UR FAX NUMBER   ADMISSION DENIALS (Administrative/Medical Necessity) [phone]   DISCHARGE SUPPORT TEAM (NETWORK) [phone]   PARENT CHILD HEALTH (Maternity/NICU/Pediatrics) [phone]   [facility] [phone]   [facility] [phone]   [facility] [phone]   [facility] [phone]   [facility] [phone]   [facility] [phone]   [facility] [phone]   [facility] [phone]   [facility] [phone]   [facility] [phone]   [facility] [phone]   [facility] [phone]              [1]   Past Medical History:  Diagnosis Date    Basal cell carcinoma 08/15/2023    Left infraortbital

## 2025-07-03 NOTE — ANESTHESIA POSTPROCEDURE EVALUATION
Post-Op Assessment Note    CV Status:  Stable    Pain management: adequate       Mental Status:  Alert and awake   Hydration Status:  Euvolemic   PONV Controlled:  Controlled   Airway Patency:  Patent     Post Op Vitals Reviewed: Yes    No anethesia notable event occurred.    Staff: CRNA           Last Filed PACU Vitals:  Vitals Value Taken Time   Temp 98.6    Pulse 89    /84    Resp     SpO2 96FM 6l

## 2025-07-03 NOTE — PLAN OF CARE
Problem: PAIN - ADULT  Goal: Verbalizes/displays adequate comfort level or baseline comfort level  Description: Interventions:  - Encourage patient to monitor pain and request assistance  - Assess pain using appropriate pain scale  - Administer analgesics as ordered based on type and severity of pain and evaluate response  - Implement non-pharmacological measures as appropriate and evaluate response  - Consider cultural and social influences on pain and pain management  - Notify physician/advanced practitioner if interventions unsuccessful or patient reports new pain  - Educate patient/family on pain management process including their role and importance of  reporting pain   - Provide non-pharmacologic/complimentary pain relief interventions  Outcome: Progressing     Problem: INFECTION - ADULT  Goal: Absence or prevention of progression during hospitalization  Description: INTERVENTIONS:  - Assess and monitor for signs and symptoms of infection  - Monitor lab/diagnostic results  - Monitor all insertion sites, i.e. indwelling lines, tubes, and drains  - Monitor endotracheal if appropriate and nasal secretions for changes in amount and color  - Hastings appropriate cooling/warming therapies per order  - Administer medications as ordered  - Instruct and encourage patient and family to use good hand hygiene technique  - Identify and instruct in appropriate isolation precautions for identified infection/condition  Outcome: Progressing  Goal: Absence of fever/infection during neutropenic period  Description: INTERVENTIONS:  - Monitor WBC  - Perform strict hand hygiene  - Limit to healthy visitors only  - No plants, dried, fresh or silk flowers with anderson in patient room  Outcome: Progressing     Problem: DISCHARGE PLANNING  Goal: Discharge to home or other facility with appropriate resources  Description: INTERVENTIONS:  - Identify barriers to discharge w/patient and caregiver  - Arrange for needed discharge resources  and transportation as appropriate  - Identify discharge learning needs (meds, wound care, etc.)  - Arrange for interpretive services to assist at discharge as needed  - Refer to Case Management Department for coordinating discharge planning if the patient needs post-hospital services based on physician/advanced practitioner order or complex needs related to functional status, cognitive ability, or social support system  Outcome: Progressing     Problem: Knowledge Deficit  Goal: Patient/family/caregiver demonstrates understanding of disease process, treatment plan, medications, and discharge instructions  Description: Complete learning assessment and assess knowledge base.  Interventions:  - Provide teaching at level of understanding  - Provide teaching via preferred learning methods  Outcome: Progressing

## 2025-07-03 NOTE — PROGRESS NOTES
Progress Note - Urology   Name: Cj Angelo 69 y.o. male I MRN: [id number]  Unit/Bed#: W -01 I Date of Admission: 7/2/2025   Date of Service: 7/3/2025 I Hospital Day: 0     Assessment & Plan  Obstructive Nephrolithasis  CT showing a 5 mm distal left ureteral stone just prior to the UVJ.  WBC 13  UA negative  Creatinine at baseline  Diagnosed with stone on 6/30--representing due to uncontrolled pain  Low grade 100.3F yesterday afternoon   Continued uncontrolled pain overnight  No stone passage      Plan:  Discussed with patient cystoscopy, L ureteroscopy, holmium laser lithotripsy, basket stone extraction, retrograde pyelogram, insertion of L ureteral stent.  Discussed risk associated with procedure including risk with anesthesia, bleeding, infection, damage to kidneys, ureter, bladder, inability to treat stone, ureteral stricture, need for delayed ureteroscopy for any signs of infection.  Discussed stent colic including frequency, urgency, hematuria, flank pain.  Consent signed and placed in preop holding  Possible discharge later today, pending recovery and OR findings          Subjective:   HPI: Seen at bedside.  Reports uncontrolled pain overnight.  Currently his pain is at a 7/10.  Discussed Elevated temp at 100.3 yesterday afternoon.  Patient denies feeling feverish, chills, night sweats.  No urinary symptoms    Review of Systems   Constitutional:  Negative for chills and fever.   Respiratory:  Negative for cough and shortness of breath.    Cardiovascular:  Negative for chest pain and palpitations.   Gastrointestinal:  Positive for abdominal pain. Negative for vomiting.   Genitourinary:  Positive for flank pain. Negative for dysuria and hematuria.   Musculoskeletal:  Negative for arthralgias and back pain.   Skin:  Negative for color change and rash.   All other systems reviewed and are negative.      Objective:    Vitals: Blood pressure 168/95, pulse 75, temperature (!) 97.3 °F (36.3 °C), resp. rate  "20, height 5' 11\" (1.803 m), weight 86.2 kg (190 lb), SpO2 95%.,Body mass index is 26.5 kg/m².    Physical Exam  Constitutional:       General: He is not in acute distress.     Appearance: Normal appearance. He is normal weight. He is not ill-appearing or toxic-appearing.   HENT:      Head: Normocephalic and atraumatic.      Right Ear: External ear normal.      Left Ear: External ear normal.      Nose: Nose normal.      Mouth/Throat:      Mouth: Mucous membranes are moist.     Eyes:      General: No scleral icterus.     Conjunctiva/sclera: Conjunctivae normal.       Cardiovascular:      Rate and Rhythm: Normal rate.      Pulses: Normal pulses.   Pulmonary:      Effort: Pulmonary effort is normal.   Abdominal:      General: There is no distension.      Tenderness: There is abdominal tenderness. There is no guarding.     Neurological:      General: No focal deficit present.      Mental Status: He is alert and oriented to person, place, and time. Mental status is at baseline.     Psychiatric:         Mood and Affect: Mood normal.         Behavior: Behavior normal.         Thought Content: Thought content normal.         Judgment: Judgment normal.              Labs:  Recent Labs     07/02/25  0744 07/03/25  0343   WBC 14.64* 13.78*       Recent Labs     07/02/25  0744 07/03/25  0343   HGB 15.5 13.4     Recent Labs     07/02/25  0744 07/03/25  0343   HCT 47.4 40.9     Recent Labs     07/02/25  0744 07/03/25  0343   CREATININE 1.21 1.20         History:  Past Medical History[1]  Social History[2]  Past Surgical History[3]  Family History[4]    Silva Graves PA-C  Date: 7/3/2025 Time: 2:20 PM          [1]   Past Medical History:  Diagnosis Date    Basal cell carcinoma 08/15/2023    Left infraortbital   [2]   Social History  Socioeconomic History    Marital status: Unknown   Tobacco Use    Smoking status: Never    Smokeless tobacco: Never   Substance and Sexual Activity    Alcohol use: Not Currently    Drug use: Not " Currently     Social Drivers of Health     Food Insecurity: No Food Insecurity (7/2/2025)    Nursing - Inadequate Food Risk Classification     Ran Out of Food in the Last Year: Never true   Transportation Needs: No Transportation Needs (7/2/2025)    Nursing - Transportation Risk Classification     Lack of Transportation: No   Intimate Partner Violence: Unknown (7/2/2025)    Nursing IPS     Physically Hurt by Someone: No     Hurt or Threatened by Someone: No   Housing Stability: Unknown (7/2/2025)    Nursing: Inadequate Housing Risk Classification     Unable to Pay for Housing in the Last Year: No     Has Housing: No   [3]   Past Surgical History:  Procedure Laterality Date    MOHS SURGERY Left 08/15/2023    BCC- Left infraorbital   [4] No family history on file.

## 2025-07-04 ENCOUNTER — TELEPHONE (OUTPATIENT)
Dept: OTHER | Facility: HOSPITAL | Age: 70
End: 2025-07-04

## 2025-07-04 DIAGNOSIS — Z87.442 PERSONAL HISTORY OF KIDNEY STONES: Primary | ICD-10-CM

## 2025-07-04 PROBLEM — E83.42 HYPOMAGNESEMIA: Status: RESOLVED | Noted: 2025-07-03 | Resolved: 2025-07-04

## 2025-07-04 PROBLEM — D72.829 LEUKOCYTOSIS: Status: ACTIVE | Noted: 2025-07-04

## 2025-07-04 PROBLEM — E87.1 HYPONATREMIA: Status: ACTIVE | Noted: 2025-07-04

## 2025-07-04 PROBLEM — R03.0 ELEVATED BP WITHOUT DIAGNOSIS OF HYPERTENSION: Status: RESOLVED | Noted: 2025-07-03 | Resolved: 2025-07-04

## 2025-07-04 LAB
ANION GAP SERPL CALCULATED.3IONS-SCNC: 4 MMOL/L (ref 4–13)
BUN SERPL-MCNC: 23 MG/DL (ref 5–25)
CALCIUM SERPL-MCNC: 7.9 MG/DL (ref 8.4–10.2)
CHLORIDE SERPL-SCNC: 105 MMOL/L (ref 96–108)
CO2 SERPL-SCNC: 25 MMOL/L (ref 21–32)
CREAT SERPL-MCNC: 1.09 MG/DL (ref 0.6–1.3)
ERYTHROCYTE [DISTWIDTH] IN BLOOD BY AUTOMATED COUNT: 14.2 % (ref 11.6–15.1)
GFR SERPL CREATININE-BSD FRML MDRD: 69 ML/MIN/1.73SQ M
GLUCOSE P FAST SERPL-MCNC: 137 MG/DL (ref 65–99)
GLUCOSE SERPL-MCNC: 137 MG/DL (ref 65–140)
HCT VFR BLD AUTO: 41 % (ref 36.5–49.3)
HGB BLD-MCNC: 13.5 G/DL (ref 12–17)
MAGNESIUM SERPL-MCNC: 1.9 MG/DL (ref 1.9–2.7)
MCH RBC QN AUTO: 30.4 PG (ref 26.8–34.3)
MCHC RBC AUTO-ENTMCNC: 32.9 G/DL (ref 31.4–37.4)
MCV RBC AUTO: 92 FL (ref 82–98)
PLATELET # BLD AUTO: 256 THOUSANDS/UL (ref 149–390)
PMV BLD AUTO: 9.1 FL (ref 8.9–12.7)
POTASSIUM SERPL-SCNC: 4.4 MMOL/L (ref 3.5–5.3)
RBC # BLD AUTO: 4.44 MILLION/UL (ref 3.88–5.62)
SODIUM SERPL-SCNC: 134 MMOL/L (ref 135–147)
WBC # BLD AUTO: 14.13 THOUSAND/UL (ref 4.31–10.16)

## 2025-07-04 PROCEDURE — 83735 ASSAY OF MAGNESIUM: CPT | Performed by: INTERNAL MEDICINE

## 2025-07-04 PROCEDURE — 85027 COMPLETE CBC AUTOMATED: CPT | Performed by: INTERNAL MEDICINE

## 2025-07-04 PROCEDURE — 99232 SBSQ HOSP IP/OBS MODERATE 35: CPT | Performed by: INTERNAL MEDICINE

## 2025-07-04 PROCEDURE — 99232 SBSQ HOSP IP/OBS MODERATE 35: CPT

## 2025-07-04 PROCEDURE — 80048 BASIC METABOLIC PNL TOTAL CA: CPT | Performed by: INTERNAL MEDICINE

## 2025-07-04 RX ORDER — CEPHALEXIN 500 MG/1
500 CAPSULE ORAL EVERY 12 HOURS SCHEDULED
Qty: 6 CAPSULE | Refills: 0 | Status: SHIPPED | OUTPATIENT
Start: 2025-07-04 | End: 2025-07-06

## 2025-07-04 RX ORDER — CEPHALEXIN 500 MG/1
500 CAPSULE ORAL EVERY 6 HOURS SCHEDULED
Qty: 8 CAPSULE | Refills: 0 | Status: SHIPPED | OUTPATIENT
Start: 2025-07-04 | End: 2025-07-05

## 2025-07-04 RX ORDER — OXYBUTYNIN CHLORIDE 5 MG/1
5 TABLET, EXTENDED RELEASE ORAL DAILY
Qty: 19 TABLET | Refills: 0 | Status: SHIPPED | OUTPATIENT
Start: 2025-07-05 | End: 2025-07-06

## 2025-07-04 RX ORDER — CEPHALEXIN 500 MG/1
500 CAPSULE ORAL EVERY 12 HOURS SCHEDULED
Status: DISCONTINUED | OUTPATIENT
Start: 2025-07-04 | End: 2025-07-04

## 2025-07-04 RX ORDER — POLYETHYLENE GLYCOL 3350 17 G/17G
17 POWDER, FOR SOLUTION ORAL DAILY PRN
Qty: 510 G | Refills: 0 | Status: SHIPPED | OUTPATIENT
Start: 2025-07-04 | End: 2025-08-03

## 2025-07-04 RX ORDER — TAMSULOSIN HYDROCHLORIDE 0.4 MG/1
0.4 CAPSULE ORAL
Qty: 21 CAPSULE | Refills: 0 | Status: SHIPPED | OUTPATIENT
Start: 2025-07-04 | End: 2025-07-05

## 2025-07-04 RX ORDER — OXYBUTYNIN CHLORIDE 5 MG/1
5 TABLET, EXTENDED RELEASE ORAL DAILY
Status: DISCONTINUED | OUTPATIENT
Start: 2025-07-04 | End: 2025-07-05

## 2025-07-04 RX ORDER — PHENAZOPYRIDINE HYDROCHLORIDE 100 MG/1
100 TABLET, FILM COATED ORAL
Qty: 66 TABLET | Refills: 0 | Status: SHIPPED | OUTPATIENT
Start: 2025-07-04 | End: 2025-07-05

## 2025-07-04 RX ORDER — ONDANSETRON 4 MG/1
4 TABLET, FILM COATED ORAL EVERY 8 HOURS PRN
Qty: 20 TABLET | Refills: 0 | Status: SHIPPED | OUTPATIENT
Start: 2025-07-04 | End: 2025-07-16

## 2025-07-04 RX ORDER — SENNOSIDES 8.6 MG
8.6 TABLET ORAL DAILY PRN
Qty: 5 TABLET | Refills: 1 | Status: SHIPPED | OUTPATIENT
Start: 2025-07-04 | End: 2025-08-03

## 2025-07-04 RX ORDER — CEPHALEXIN 500 MG/1
500 CAPSULE ORAL EVERY 6 HOURS SCHEDULED
Status: CANCELLED | OUTPATIENT
Start: 2025-07-05 | End: 2025-07-09

## 2025-07-04 RX ORDER — CEPHALEXIN 500 MG/1
500 CAPSULE ORAL EVERY 12 HOURS SCHEDULED
Status: DISCONTINUED | OUTPATIENT
Start: 2025-07-04 | End: 2025-07-06 | Stop reason: HOSPADM

## 2025-07-04 RX ADMIN — DOCUSATE SODIUM 100 MG: 100 CAPSULE, LIQUID FILLED ORAL at 08:46

## 2025-07-04 RX ADMIN — OXYBUTYNIN CHLORIDE 5 MG: 5 TABLET, EXTENDED RELEASE ORAL at 11:27

## 2025-07-04 RX ADMIN — TAMSULOSIN HYDROCHLORIDE 0.4 MG: 0.4 CAPSULE ORAL at 18:27

## 2025-07-04 RX ADMIN — SODIUM CHLORIDE 125 ML/HR: 0.9 INJECTION, SOLUTION INTRAVENOUS at 15:30

## 2025-07-04 RX ADMIN — ACETAMINOPHEN 650 MG: 325 TABLET ORAL at 11:28

## 2025-07-04 RX ADMIN — DOCUSATE SODIUM 100 MG: 100 CAPSULE, LIQUID FILLED ORAL at 18:28

## 2025-07-04 RX ADMIN — SODIUM CHLORIDE 125 ML/HR: 0.9 INJECTION, SOLUTION INTRAVENOUS at 06:57

## 2025-07-04 RX ADMIN — PHENAZOPYRIDINE 100 MG: 100 TABLET ORAL at 18:27

## 2025-07-04 RX ADMIN — POLYETHYLENE GLYCOL 3350 17 G: 17 POWDER, FOR SOLUTION ORAL at 14:57

## 2025-07-04 RX ADMIN — CEFAZOLIN SODIUM 1000 MG: 1 SOLUTION INTRAVENOUS at 08:45

## 2025-07-04 RX ADMIN — SENNOSIDES 8.6 MG: 8.6 TABLET, FILM COATED ORAL at 08:46

## 2025-07-04 RX ADMIN — PHENAZOPYRIDINE 100 MG: 100 TABLET ORAL at 08:46

## 2025-07-04 RX ADMIN — CEPHALEXIN 500 MG: 500 CAPSULE ORAL at 23:09

## 2025-07-04 RX ADMIN — PHENAZOPYRIDINE 100 MG: 100 TABLET ORAL at 11:27

## 2025-07-04 NOTE — ASSESSMENT & PLAN NOTE
-69 years old male with no significant known past medical history presented yesterday 7/2 with left-sided flank pain and associated symptoms of nausea, vomiting, constipation, difficulty urinating.  Patient is admitted due to uncontrolled pain.  -  Patient presented to [facility] on Monday 6/30 with sharp left flank pain radiating into the lower abdominal with nausea and vomiting.  Patient denied hematuria, fever, chills, history of kidney stones.  Labs reviewed patient did not have leukocytosis or hyperglycemia.  UA showed large blood amount in the urine, proteinuria, trace leukocyte esterase. CT abdomen pelvis w contrast showed:Moderate left-sided hydroureteronephrosis with left perinephric free fluid and induration.  Patient was discharged with pain medications and was told to follow-up with urology.  -7/2 CT abdomen pelvis with contrast remarkable for 5 mm distal left ureteral calculus at the vesicoureteral junction eliciting left renal inflammation and mild left hydroureteronephrosis.  - 7/2: UA remarkable for glucose, trace ketones, blood and protein.  - In the ED was given lactated Ringer bolus, Dilaudid, Toradol.  - 7/3:  CYSTOSCOPY URETEROSCOPY WITH RETROGRADE PYELOGRAM AND INSERTION STENT URETERAL, STONE BASKET EXTRACTION (Left: Bladder). Stable post-op  - 7/4: Overnight post procedure and on the day following procedure (7/4), pt had dark red urine. Urology evaluated the patient, and he was found to be stable.  Void trial attempted.  Patient is having difficulty urinating.    Plan:  - 7/3 cystoscopy and stenting  - Void trial on 7/4: Attempted, having difficulty urinating.  - Will consider: Put back the Low catheter if the patient continues to have difficulty voiding.  -Dilaudid 0.5mg q4 PRN for severe pain  - Oxycodone 5 mg for moderate pain  -Tylenol 650 q6 PRN  -Zofran 4mg q6 PRN  -Colace 100mg BID  -Senna 8.6mg daily  -MiraLAX 17 G PACKET Flomax 0.4  -  Phenazopyridine 100 mg 3 times daily.  - Oxybutynin 5 mg.

## 2025-07-04 NOTE — DISCHARGE INSTR - AVS FIRST PAGE
Dear Cj Angelo,     It was our pleasure to care for you here at [facility].  It is our hope that we were always able to exceed the expected standards for your care during your stay.  You were hospitalized due to left sided flank pain .  You were cared for on the fourth floor by Beth Trevino MD under the service of Awais Moss* with the [facility] who covers for your primary care physician (PCP), No primary care provider on file., while you were hospitalized.  If you have any questions or concerns related to this hospitalization, you may contact us at .  For follow up as well as any medication refills, we recommend that you follow up with your primary care physician.  A registered nurse will reach out to you by phone within a few days after your discharge to answer any additional questions that you may have after going home.  However, at this time we provide for you here, the most important instructions / recommendations at discharge:     Notable Medication Adjustments -   START taking Keflex 500 mg tablet. Tonight on 7/6/2025 take one dose at 9 pm. Then from tomorrow, take Keflex 500 mg tablet 4 times a day for up through 7/7/2025. Your last dose will be the evening of 7/7/2025  START taking Phenazopyridine 200 mg tablet three times a day for 2 days  START taking tamsulosin (FLOMAX) 0.4 mg by mouth daily with dinner.   START taking oxybutynin 10 mg tablet daily follow-up with urology  If you experience constipation you can take MIRALAX 17 mg daily as needed.   If you experience constipation you can take senna 8.6 mg daily as needed.   You can take ZOFRAN 4 mg tablet every 8 hours as needed for nausea and or vomiting.   You can take over the counter TYLENOL 325 mg tablet by mouth every 6 hours as needed for mild pain.   No other medication changes have been made.   Continue all other home medications  Testing  Required after Discharge -   Basic Metabolic Panel   Complete Blood Count   Ionized Calcium/Calcium  Urine analysis   ** Please contact your PCP to request testing orders for any of the testing recommended here **  Important follow up information -   Please follow up with  Urology for your stent removal procedure.  Please follow up with Primary Care within one week of discharge   A work note was provided for you.   Other Instructions -   Monitor for falls, dizziness while on FLOMAX. Brace yourself while you change position while taking FLOMAX  If you develop fever, chills, back pain, burning sensation while you pee, or worsening of other symptoms please go to your nearest emergency room department.   Please stay well. It was a please taking care of you.   Please review this entire after visit summary as additional general instructions including medication list, appointments, activity, diet, any pertinent wound care, and other additional recommendations from your care team that may be provided for you.      Sincerely,     Beth Trevino MD

## 2025-07-04 NOTE — ANESTHESIA POSTPROCEDURE EVALUATION
Post-Op Assessment Note            No anethesia notable event occurred.    Comments: uneventful PACU course        Last Filed PACU Vitals:  Vitals Value Taken Time   Temp 98.6 °F (37 °C) 07/03/25 16:58   Pulse 96 07/03/25 17:20   /79 07/03/25 17:15   Resp 18 07/03/25 17:18   SpO2 92 % 07/03/25 17:20   Vitals shown include unfiled device data.    Modified Rogerio:     Vitals Value Taken Time   Activity 2 07/03/25 17:15   Respiration 2 07/03/25 17:15   Circulation 2 07/03/25 17:15   Consciousness 2 07/03/25 17:15   Oxygen Saturation 2 07/03/25 17:15     Modified Rogerio Score: 10

## 2025-07-04 NOTE — ASSESSMENT & PLAN NOTE
CT showing a 5 mm distal left ureteral stone just prior to the UVJ.  Diagnosed with stone on 6/30--presented to ED due to uncontrolled pain  Low grade 100.3F 07/03/2025 and white blood count 13.78  Labs/VS on 07/04/2025 indicated WBC 14.13, creatinine 1.09, temp 98.6, heart rate 73, pulse 155/99  Left ureteroscopy, stone basket extraction, and stent placement 07/03/2025  - Oxybutynin 5 mg daily, tamsulosin 0.4 mg daily, and Pyridium 100 mg 3 times a day to help with stent discomfort.  -Needs follow-up outpatient follow-up with [facility] prior for possible stent removal  -Okay to continue Keflex 500 mg twice a day for approximately 5 days as well as additional antibiotics to take at time of office visit for possible stent removal.

## 2025-07-04 NOTE — INCIDENTAL FINDINGS
The following findings require follow up:  Radiographic finding   Finding: On reviewing external records with Tristen Pisano, patient had a CTA abdomen pelvis w wo contrast performed on 6/30                 showed asymmetric wall thickening of the gastric fundus at the greater curvature.    Follow up required: Recommend follow-up with endoscopy to exclude underlying mass versus possible gastritis.    Follow up should be done within when possible.    Please notify the following clinician to assist with the follow up:   Dr. Asha Correia    Incidental finding results were discussed with the Patient by Dr. John Reynolds on 07/04/25.   They expressed understanding and all questions answered.

## 2025-07-04 NOTE — TELEPHONE ENCOUNTER
Patient underwent stone extraction and stent placement 07/04/2025.  Please arrange for possible follow-up cystoscopy and stent removal with [facility] prior.

## 2025-07-04 NOTE — PLAN OF CARE
Problem: INFECTION - ADULT  Goal: Absence or prevention of progression during hospitalization  Description: INTERVENTIONS:  - Assess and monitor for signs and symptoms of infection  - Monitor lab/diagnostic results  - Monitor all insertion sites, i.e. indwelling lines, tubes, and drains  - Monitor endotracheal if appropriate and nasal secretions for changes in amount and color  - [place] appropriate cooling/warming therapies per order  - Administer medications as ordered  - Instruct and encourage patient and family to use good hand hygiene technique  - Identify and instruct in appropriate isolation precautions for identified infection/condition  Outcome: Progressing  Goal: Absence of fever/infection during neutropenic period  Description: INTERVENTIONS:  - Monitor WBC  - Perform strict hand hygiene  - Limit to healthy visitors only  - No plants, dried, fresh or silk flowers with anderson in patient room  Outcome: Progressing     Problem: PAIN - ADULT  Goal: Verbalizes/displays adequate comfort level or baseline comfort level  Description: Interventions:  - Encourage patient to monitor pain and request assistance  - Assess pain using appropriate pain scale  - Administer analgesics as ordered based on type and severity of pain and evaluate response  - Implement non-pharmacological measures as appropriate and evaluate response  - Consider cultural and social influences on pain and pain management  - Notify physician/advanced practitioner if interventions unsuccessful or patient reports new pain  - Educate patient/family on pain management process including their role and importance of  reporting pain   - Provide non-pharmacologic/complimentary pain relief interventions  Outcome: Progressing     Problem: DISCHARGE PLANNING  Goal: Discharge to home or other facility with appropriate resources  Description: INTERVENTIONS:  - Identify barriers to discharge w/patient and caregiver  - Arrange for needed discharge resources  and transportation as appropriate  - Identify discharge learning needs (meds, wound care, etc.)  - Arrange for interpretive services to assist at discharge as needed  - Refer to Case Management Department for coordinating discharge planning if the patient needs post-hospital services based on physician/advanced practitioner order or complex needs related to functional status, cognitive ability, or social support system  Outcome: Progressing     Problem: Knowledge Deficit  Goal: Patient/family/caregiver demonstrates understanding of disease process, treatment plan, medications, and discharge instructions  Description: Complete learning assessment and assess knowledge base.  Interventions:  - Provide teaching at level of understanding  - Provide teaching via preferred learning methods  Outcome: Progressing

## 2025-07-04 NOTE — DISCHARGE SUMMARY
Discharge Summary - Hospitalist   Name: Cj Angelo 69 y.o. male I MRN: [id number]  Unit/Bed#: W -01 I Date of Admission: 7/2/2025   Date of Service: 7/4/2025 I Hospital Day: 0     Assessment & Plan  Obstructive Nephrolithasis  -69 years old male with no significant known past medical history presented yesterday 7/2 with left-sided flank pain and associated symptoms of nausea, vomiting, constipation, difficulty urinating.  Patient is admitted due to uncontrolled pain.  -  Patient presented to [facility] on Monday 6/30 with sharp left flank pain radiating into the lower abdominal with nausea and vomiting.  Patient denied hematuria, fever, chills, history of kidney stones.  Labs reviewed patient did not have leukocytosis or hyperglycemia.  UA showed large blood amount in the urine, proteinuria, trace leukocyte esterase. CT abdomen pelvis w contrast showed:Moderate left-sided hydroureteronephrosis with left perinephric free fluid and induration.  Patient was discharged with pain medications and was told to follow-up with urology.  -7/2 CT abdomen pelvis with contrast remarkable for 5 mm distal left ureteral calculus at the vesicoureteral junction eliciting left renal inflammation and mild left hydroureteronephrosis.  - 7/2: UA remarkable for glucose, trace ketones, blood and protein.  - In the ED was given lactated Ringer bolus, Dilaudid, Toradol.  - 7/3:  CYSTOSCOPY URETEROSCOPY WITH RETROGRADE PYELOGRAM AND INSERTION STENT URETERAL, STONE BASKET EXTRACTION (Left: Bladder). Stable post-op    Plan:  - OR 7/3 for cystoscopy and stenting  - Appreciate urology's input: ill do void trial on 7/4, abx  -Dilaudid 0.5mg q4 PRN for severe pain  - Oxycodone 5 mg for moderate pain  -Tylenol 650 q6 PRN  -Zofran 4mg q6 PRN  -Colace 100mg BID  -Senna 8.6mg daily  -MiraLAX 17 G PACKET  Pre-diabetes  -A1c due to glucose in urine and elevated glucose of 183 on 7/2 on admission  - Hemoglobin A1c  is 6 on 7/2. Prediabetic  Leukocytosis  -Pt is afebrile and stable     Medical Problems       Resolved Problems  Date Reviewed: 7/4/2025          Resolved    Hypomagnesemia 7/4/2025     Resolved by  Awais Bryant MD    Elevated BP without diagnosis of hypertension 7/4/2025     Resolved by  Awais Bryant MD        Discharging Physician / Practitioner: John Reynolds MD  PCP: No primary care provider on file.  Admission Date:   Admission Orders (From admission, onward)       Ordered        07/02/25 0858  Place in Observation  Once                          Discharge Date: 07/04/25    Next Steps for Physician/AP Assuming Care:  CBC to follow up on leukocytosis  BMP to check Na and Ca  Hemoglobin A1c to follow up on prediabetes    Test Results Pending at Discharge (will require follow up):  None    Medication Changes for Discharge & Rationale:   Keflex 500 BID through 7/7, per urology  Flomax at night to help with stent discomfort  See after visit summary for reconciled discharge medications provided to patient and/or family.     Consultations During Hospital Stay:  Urology    Procedures Performed:   7/3: CYSTOSCOPY URETEROSCOPY WITH RETROGRADE PYELOGRAM AND INSERTION STENT URETERAL, STONE BASKET EXTRACTION (Left: Bladder)    Significant Findings / Test Results:   CT abd pelvis w IV contrast on 7/2: 5 mm distal left ureteral calculus at the vesicoureteral junction. Mild left hydroureteronephrosis. Left perinephric fat stranding. Nonobstructing 6 mm left renal calculus. No right hydroureteronephrosis   UA 7/2: 1+ protein, small blood  A1c of 6 on 7/2  Na of 134 on 7/4  Ca of 7.9 on 7/4    Incidental Findings:   On reviewing external records,  CTA abd pelvis w wo contrast performed on 6/30 showed asymmetric wall thickening of the gastric fundus at the greater curvature. Recommend follow-up with endoscopy to exclude underlying mass versus possible gastritis.     I reviewed the above mentioned  incidental findings with the patient and/or family and they expressed understanding.    Hospital Course:   Cj Angelo is a 69 y.o. male patient who originally presented to the hospital on 7/2/2025 due to left-sided flank pain and nausea and vomiting and difficulty urinating. In the ED, the pt was given dilaudid, toradol, and lactated ringer bolus. CT of the abdomen pelvis with contrast was remarkable for 5 mm distal left ureteral calculus at the vesicoureteral junction eliciting left renal inflammation and mild left hydroureteronephrosis. Pt continued to be in pain, so he was admitted because of uncontrolled pain. Given the continued pain, despite dilaudid doses and expulsive therapy with Flomax, urology decided to take the pt to the OR for ureteroscopy with stent insertion on 7/3. Distal ureteral stone was basked with ease per urology. Pt was stable post op. Overnight post procedure and on the day following procedure (7/4), pt had dark red urine. Urology evaluated the patient, and he was found to be stable. In coordination with urology, discharge was planned, and a follow up appointment for ureteral stent removal was planned.    Also, while admitted labs were notable for A1c of 6 on 7/2, Na of 134 on 7/4, and Ca of 7.9 on 7/4. Recommend PCP follow up.    The patient, initially admitted to the hospital as inpatient, was discharged earlier than expected given the following: was stable after ureteroscopy with stent insertion. In coordination with urology, discharge was planned.  Please see above list of diagnoses and related plan for additional information.     Discharge Day Visit / Exam:   Subjective:  Pt reported dark red urine. Pt denied fever, chills, night sweats, SOB, wheezing, cough, CP, abdominal tenderness, dysuria, burning with urination.  Vitals: Blood Pressure: 136/93 (07/04/25 1515)  Pulse: 68 (07/04/25 1515)  Temperature: 98.4 °F (36.9 °C) (07/04/25 1515)  Temp Source: Oral (07/04/25  "1515)  Respirations: 14 (07/04/25 1515)  Height: 5' 11\" (180.3 cm) (07/02/25 1700)  Weight - Scale: 86.2 kg (190 lb) (07/02/25 1700)  SpO2: 90 % (07/04/25 1515)  Physical Exam  Constitutional:       Appearance: Normal appearance.   HENT:      Head: Normocephalic and atraumatic.      Right Ear: External ear normal.      Left Ear: External ear normal.      Nose: Nose normal.     Eyes:      Conjunctiva/sclera: Conjunctivae normal.       Cardiovascular:      Rate and Rhythm: Normal rate and regular rhythm.   Pulmonary:      Effort: Pulmonary effort is normal.      Breath sounds: Normal breath sounds.   Abdominal:      Tenderness: There is no abdominal tenderness. There is no right CVA tenderness, left CVA tenderness, guarding or rebound.      Hernia: A hernia is present.      Comments: Reducible midline hernia   Genitourinary:     Penis: Normal.       Testes: Normal.      Comments: Pt was offered a chaperone. Pt declined.    Musculoskeletal:      Right lower leg: No edema.      Left lower leg: No edema.     Neurological:      General: No focal deficit present.      Mental Status: He is alert and oriented to person, place, and time.     Psychiatric:         Mood and Affect: Mood normal.         Behavior: Behavior normal.          Discussion with Family: Updated  (wife) via phone.    Discharge instructions/Information to patient and family:   See after visit summary for information provided to patient and family.      Provisions for Follow-Up Care:  See after visit summary for information related to follow-up care and any pertinent home health orders.      Mobility at time of Discharge:   Basic Mobility Inpatient Raw Score: 24  JH-HLM Goal: 8: Walk 250 feet or more  JH-HLM Achieved: 8: Walk 250 feet ot more  HLM Goal achieved. Continue to encourage appropriate mobility.     Disposition:   Home    Planned Readmission: No    Administrative Statements   Discharge Statement:  I have spent a total time of 40 " minutes in caring for this patient on the day of the visit/encounter. >30 minutes of time was spent on: Diagnostic results, Instructions for management, Patient and family education, Counseling / Coordination of care, Documenting in the medical record, Reviewing / ordering tests, medicine, procedures  , and Communicating with other healthcare professionals .    **Please Note: This note may have been constructed using a voice recognition system**

## 2025-07-04 NOTE — ASSESSMENT & PLAN NOTE
-69 years old male with no significant known past medical history presented yesterday 7/2 with left-sided flank pain and associated symptoms of nausea, vomiting, constipation, difficulty urinating.  Patient is admitted due to uncontrolled pain.  -  Patient presented to [facility] on Monday 6/30 with sharp left flank pain radiating into the lower abdominal with nausea and vomiting.  Patient denied hematuria, fever, chills, history of kidney stones.  Labs reviewed patient did not have leukocytosis or hyperglycemia.  UA showed large blood amount in the urine, proteinuria, trace leukocyte esterase. CT abdomen pelvis w contrast showed:Moderate left-sided hydroureteronephrosis with left perinephric free fluid and induration.  Patient was discharged with pain medications and was told to follow-up with urology.  -7/2 CT abdomen pelvis with contrast remarkable for 5 mm distal left ureteral calculus at the vesicoureteral junction eliciting left renal inflammation and mild left hydroureteronephrosis.  - 7/2: UA remarkable for glucose, trace ketones, blood and protein.  - In the ED was given lactated Ringer bolus, Dilaudid, Toradol.  - 7/3:  CYSTOSCOPY URETEROSCOPY WITH RETROGRADE PYELOGRAM AND INSERTION STENT URETERAL, STONE BASKET EXTRACTION (Left: Bladder). Stable post-op    Plan:  - OR 7/3 for cystoscopy and stenting  - Appreciate urology's input: ill do void trial on 7/4, abx  -Dilaudid 0.5mg q4 PRN for severe pain  - Oxycodone 5 mg for moderate pain  -Tylenol 650 q6 PRN  -Zofran 4mg q6 PRN  -Colace 100mg BID  -Senna 8.6mg daily  -MiraLAX 17 G PACKET

## 2025-07-04 NOTE — PROGRESS NOTES
Progress Note - Hospitalist   Name: Cj Angelo 69 y.o. male I MRN: [id number]  Unit/Bed#: W -01 I Date of Admission: 7/2/2025   Date of Service: 7/4/2025 I Hospital Day: 0    Assessment & Plan  Obstructive Nephrolithasis  -69 years old male with no significant known past medical history presented yesterday 7/2 with left-sided flank pain and associated symptoms of nausea, vomiting, constipation, difficulty urinating.  Patient is admitted due to uncontrolled pain.  -  Patient presented to [facility] on Monday 6/30 with sharp left flank pain radiating into the lower abdominal with nausea and vomiting.  Patient denied hematuria, fever, chills, history of kidney stones.  Labs reviewed patient did not have leukocytosis or hyperglycemia.  UA showed large blood amount in the urine, proteinuria, trace leukocyte esterase. CT abdomen pelvis w contrast showed:Moderate left-sided hydroureteronephrosis with left perinephric free fluid and induration.  Patient was discharged with pain medications and was told to follow-up with urology.  -7/2 CT abdomen pelvis with contrast remarkable for 5 mm distal left ureteral calculus at the vesicoureteral junction eliciting left renal inflammation and mild left hydroureteronephrosis.  - 7/2: UA remarkable for glucose, trace ketones, blood and protein.  - In the ED was given lactated Ringer bolus, Dilaudid, Toradol.  - 7/3:  CYSTOSCOPY URETEROSCOPY WITH RETROGRADE PYELOGRAM AND INSERTION STENT URETERAL, STONE BASKET EXTRACTION (Left: Bladder). Stable post-op  - 7/4: Overnight post procedure and on the day following procedure (7/4), pt had dark red urine. Urology evaluated the patient, and he was found to be stable.  Void trial attempted.  Patient is having difficulty urinating.    Plan:  - 7/3 cystoscopy and stenting  - Void trial on 7/4: Attempted, having difficulty urinating.  - Will consider: Put back the Low catheter if the patient continues to  have difficulty voiding.  -Dilaudid 0.5mg q4 PRN for severe pain  - Oxycodone 5 mg for moderate pain  -Tylenol 650 q6 PRN  -Zofran 4mg q6 PRN  -Colace 100mg BID  -Senna 8.6mg daily  -MiraLAX 17 G PACKET Flomax 0.4  - Phenazopyridine 100 mg 3 times daily.  - Oxybutynin 5 mg.  Pre-diabetes  -A1c due to glucose in urine and elevated glucose of 183 on 7/2 on admission  - Hemoglobin A1c is 6 on 7/2. Prediabetic  Leukocytosis  -Pt is afebrile and stable    VTE Pharmacologic Prophylaxis: VTE Score: 2 Low Risk (Score 0-2) - Encourage Ambulation.    Mobility:   Basic Mobility Inpatient Raw Score: 24  JH-HLM Goal: 8: Walk 250 feet or more  JH-HLM Achieved: 8: Walk 250 feet ot more  JH-HLM Goal achieved. Continue to encourage appropriate mobility.    Patient Centered Rounds: I performed bedside rounds with nursing staff today.   Discussions with Specialists or Other Care Team Provider: Urology    Education and Discussions with Family / Patient: Updated  (wife) via phone.    Current Length of Stay: 0 day(s)  Current Patient Status: Outpatient Surgery   Certification Statement: The patient will continue to require additional inpatient hospital stay due to management of nephrolithiasis  Discharge Plan: Anticipate discharge in 24-48 hrs to home.    Code Status: Level 1 - Full Code    Subjective   Overnight and in the morning, patient had dark red urine.  Patient denied fevers, chills, shortness of breath, chest pain, abdominal tenderness, dysuria, burning with urination, wheezing, cough.  Explained to patient to the patient labs and incidental finding on imaging.  The patient understands and agrees with the plan.  Discussed with the patient physical limitations upon discharge given the procedure that he had.    Patient mentioned that he snores at night, and that it is observed.  Given that he is a male older than 50 years and snores, his stop bang score is 4 points.    Objective :  Temp:  [98.1 °F (36.7 °C)-98.6 °F  (37 °C)] 98.4 °F (36.9 °C)  HR:  [68-93] 68  BP: (121-155)/(76-99) 136/93  Resp:  [14-18] 14  SpO2:  [89 %-92 %] 90 %  O2 Device: None (Room air)    Body mass index is 26.5 kg/m².     Input and Output Summary (last 24 hours):     Intake/Output Summary (Last 24 hours) at 7/4/2025 1854  Last data filed at 7/4/2025 1401  Gross per 24 hour   Intake 2402.92 ml   Output 825 ml   Net 1577.92 ml       Physical Exam  Constitutional:       General: He is not in acute distress.  HENT:      Head: Normocephalic and atraumatic.      Right Ear: External ear normal.      Left Ear: External ear normal.      Nose: Nose normal.     Eyes:      Conjunctiva/sclera: Conjunctivae normal.       Cardiovascular:      Rate and Rhythm: Normal rate and regular rhythm.      Heart sounds: Normal heart sounds.   Pulmonary:      Breath sounds: Normal breath sounds.   Abdominal:      Tenderness: There is no abdominal tenderness. There is no right CVA tenderness, left CVA tenderness or rebound.      Hernia: A hernia is present.      Comments: Reducible midline hernia.   Genitourinary:     Penis: Normal.       Testes: Normal.     Musculoskeletal:      Right lower leg: No edema.      Left lower leg: No edema.     Neurological:      Mental Status: He is alert and oriented to person, place, and time.     Psychiatric:         Mood and Affect: Mood normal.         Behavior: Behavior normal.         Lines/Drains:  Lines/Drains/Airways       Active Status       Name Placement date Placement time Site Days    Ureteral Internal Stent Left ureter 4.8 Fr. 07/03/25  1645  Left ureter  1                            Lab Results: I have reviewed the following results:   Results from last 7 days   Lab Units 07/04/25  0318 07/03/25  0343 07/02/25  0744   WBC Thousand/uL 14.13* 13.78* 14.64*   HEMOGLOBIN g/dL 13.5 13.4 15.5   HEMATOCRIT % 41.0 40.9 47.4   PLATELETS Thousands/uL 256 244 265   SEGS PCT %  --   --  86*   LYMPHO PCT %  --  9* 4*   MONO PCT %  --  16* 10    EOS PCT %  --  0 0     Results from last 7 days   Lab Units 07/04/25  0318 07/03/25  0343 07/02/25  0744   SODIUM mmol/L 134*   < > 134*   POTASSIUM mmol/L 4.4   < > 3.5   CHLORIDE mmol/L 105   < > 103   CO2 mmol/L 25   < > 25   BUN mg/dL 23   < > 19   CREATININE mg/dL 1.09   < > 1.21   ANION GAP mmol/L 4   < > 6   CALCIUM mg/dL 7.9*   < > 8.7   ALBUMIN g/dL  --   --  3.8   TOTAL BILIRUBIN mg/dL  --   --  0.65   ALK PHOS U/L  --   --  62   ALT U/L  --   --  13   AST U/L  --   --  14   GLUCOSE RANDOM mg/dL 137   < > 183*    < > = values in this interval not displayed.             Results from last 7 days   Lab Units 07/02/25  0744   HEMOGLOBIN A1C % 6.0*           Recent Cultures (last 7 days):         Imaging Results Review: I personally reviewed the following image studies in PACS and associated radiology reports: CT abdomen/pelvis. My interpretation of the radiology images/reports is: Small left kidney stone.  Other Study Results Review: Other studies reviewed include: BMP, CBC, magnesium    Last 24 Hours Medication List:     Current Facility-Administered Medications:     acetaminophen (TYLENOL) tablet 650 mg, Q6H PRN    cephalexin (KEFLEX) capsule 500 mg, Q12H TRACY    docusate sodium (COLACE) capsule 100 mg, BID    HYDROmorphone (DILAUDID) injection 0.5 mg, Q4H PRN    hyoscyamine (LEVSIN/SL) SL tablet 0.125 mg, Q4H PRN    ondansetron (ZOFRAN) injection 4 mg, Q6H PRN    oxybutynin (DITROPAN-XL) 24 hr tablet 5 mg, Daily    oxyCODONE (ROXICODONE) IR tablet 5 mg, Q4H PRN    oxyCODONE (ROXICODONE) split tablet 2.5 mg, Q4H PRN    phenazopyridine (PYRIDIUM) tablet 100 mg, TID With Meals    polyethylene glycol (MIRALAX) packet 17 g, Daily    senna (SENOKOT) tablet 8.6 mg, Daily    sodium chloride 0.9 % infusion, Continuous, Last Rate: 125 mL/hr (07/04/25 0657)    tamsulosin (FLOMAX) capsule 0.4 mg, Daily With Dinner    Administrative Statements   Today, Patient Was Seen By: John Reynolds MD    -  **Please Note: This  note may have been constructed using a voice recognition system.**

## 2025-07-04 NOTE — PLAN OF CARE
Problem: PAIN - ADULT  Goal: Verbalizes/displays adequate comfort level or baseline comfort level  Description: Interventions:  - Encourage patient to monitor pain and request assistance  - Assess pain using appropriate pain scale  - Administer analgesics as ordered based on type and severity of pain and evaluate response  - Implement non-pharmacological measures as appropriate and evaluate response  - Consider cultural and social influences on pain and pain management  - Notify physician/advanced practitioner if interventions unsuccessful or patient reports new pain  - Educate patient/family on pain management process including their role and importance of  reporting pain   - Provide non-pharmacologic/complimentary pain relief interventions  Outcome: Progressing     Problem: INFECTION - ADULT  Goal: Absence or prevention of progression during hospitalization  Description: INTERVENTIONS:  - Assess and monitor for signs and symptoms of infection  - Monitor lab/diagnostic results  - Monitor all insertion sites, i.e. indwelling lines, tubes, and drains  - Monitor endotracheal if appropriate and nasal secretions for changes in amount and color  - [place] appropriate cooling/warming therapies per order  - Administer medications as ordered  - Instruct and encourage patient and family to use good hand hygiene technique  - Identify and instruct in appropriate isolation precautions for identified infection/condition  Outcome: Progressing  Goal: Absence of fever/infection during neutropenic period  Description: INTERVENTIONS:  - Monitor WBC  - Perform strict hand hygiene  - Limit to healthy visitors only  - No plants, dried, fresh or silk flowers with anderson in patient room  Outcome: Progressing     Problem: DISCHARGE PLANNING  Goal: Discharge to home or other facility with appropriate resources  Description: INTERVENTIONS:  - Identify barriers to discharge w/patient and caregiver  - Arrange for needed discharge resources  and transportation as appropriate  - Identify discharge learning needs (meds, wound care, etc.)  - Arrange for interpretive services to assist at discharge as needed  - Refer to Case Management Department for coordinating discharge planning if the patient needs post-hospital services based on physician/advanced practitioner order or complex needs related to functional status, cognitive ability, or social support system  Outcome: Progressing     Problem: Knowledge Deficit  Goal: Patient/family/caregiver demonstrates understanding of disease process, treatment plan, medications, and discharge instructions  Description: Complete learning assessment and assess knowledge base.  Interventions:  - Provide teaching at level of understanding  - Provide teaching via preferred learning methods  Outcome: Progressing

## 2025-07-04 NOTE — PROGRESS NOTES
Progress Note - Urology   Name: Cj Angelo 69 y.o. male I MRN: [id number]  Unit/Bed#: W -01 I Date of Admission: 7/2/2025   Date of Service: 7/4/2025 I Hospital Day: 0    Assessment & Plan  Obstructive Nephrolithasis  CT showing a 5 mm distal left ureteral stone just prior to the UVJ.  Diagnosed with stone on 6/30--presented to ED due to uncontrolled pain  Low grade 100.3F 07/03/2025 and white blood count 13.78  Labs/VS on 07/04/2025 indicated WBC 14.13, creatinine 1.09, temp 98.6, heart rate 73, pulse 155/99  Left ureteroscopy, stone basket extraction, and stent placement 07/03/2025  - Oxybutynin 5 mg daily, tamsulosin 0.4 mg daily, and Pyridium 100 mg 3 times a day to help with stent discomfort.  -Needs follow-up outpatient follow-up with [facility] prior for possible stent removal  -Okay to continue Keflex 500 mg twice a day for approximately 5 days as well as additional antibiotics to take at time of office visit for possible stent removal.    Leukocytosis          Subjective   69-year-old gentleman with a distal 5 mm ureterovesicular junction stone and a nonobstructing left 6 mm calculus underwent left ureteroscopy and stone basketing with stent placement 07/03/2025.  Currently lying in bed with some stent discomfort causing some flank pain and left lower quadrant discomfort.    Objective :  Temp:  [98.1 °F (36.7 °C)-100.4 °F (38 °C)] 98.6 °F (37 °C)  HR:  [72-98] 73  BP: (110-168)/() 155/99  Resp:  [8-21] 18  SpO2:  [87 %-97 %] 92 %  O2 Device: None (Room air)    I/O         07/02 0701 07/03 0700 07/03 0701 07/04 0700 07/04 0701 07/05 0700    P.O. 240 360 120    I.V. (mL/kg)  5712.1 (66.3)     Total Intake(mL/kg) 240 (2.8) 6072.1 (70.4) 120 (1.4)    Urine (mL/kg/hr) 675 (0.3) 850 (0.4)     Stool  0     Total Output 675 850     Net -435 +5222.1 +120           Unmeasured Urine Occurrence  0 x     Unmeasured Stool Occurrence  0 x           Lines/Drains/Airways       Active Status       Name Placement  date Placement time Site Days    Urethral Catheter 18 Fr. 07/03/25  1648  --  less than 1    Ureteral Internal Stent Left ureter 4.8 Fr. 07/03/25  1645  Left ureter  less than 1                  Physical Exam  HENT:      Head: Normocephalic.   Pulmonary:      Effort: Pulmonary effort is normal.     Skin:     General: Skin is warm.     Neurological:      Mental Status: He is alert.     Psychiatric:         Mood and Affect: Mood normal.           Lab Results: I have reviewed the following results:  Recent Labs     07/02/25  0744 07/03/25  0343 07/04/25  0318   WBC 14.64*   < > 14.13*   HGB 15.5   < > 13.5   HCT 47.4   < > 41.0      < > 256   SODIUM 134*   < > 134*   K 3.5   < > 4.4      < > 105   CO2 25   < > 25   BUN 19   < > 23   CREATININE 1.21   < > 1.09   GLUC 183*   < > 137   MG  --    < > 1.9   AST 14  --   --    ALT 13  --   --    ALB 3.8  --   --    TBILI 0.65  --   --    ALKPHOS 62  --   --     < > = values in this interval not displayed.       Imaging Results Review: I personally reviewed the following image studies/reports in PACS and discussed pertinent findings with Radiology: CT abdomen/pelvis. My interpretation of the radiology images/reports is:  .

## 2025-07-05 PROBLEM — D72.829 LEUKOCYTOSIS: Status: RESOLVED | Noted: 2025-07-04 | Resolved: 2025-07-05

## 2025-07-05 LAB
ANION GAP SERPL CALCULATED.3IONS-SCNC: 6 MMOL/L (ref 4–13)
BUN SERPL-MCNC: 15 MG/DL (ref 5–25)
CA-I BLD-SCNC: 1.12 MMOL/L (ref 1.12–1.32)
CALCIUM SERPL-MCNC: 7.9 MG/DL (ref 8.4–10.2)
CHLORIDE SERPL-SCNC: 106 MMOL/L (ref 96–108)
CO2 SERPL-SCNC: 25 MMOL/L (ref 21–32)
CREAT SERPL-MCNC: 0.72 MG/DL (ref 0.6–1.3)
ERYTHROCYTE [DISTWIDTH] IN BLOOD BY AUTOMATED COUNT: 14.2 % (ref 11.6–15.1)
GFR SERPL CREATININE-BSD FRML MDRD: 96 ML/MIN/1.73SQ M
GLUCOSE P FAST SERPL-MCNC: 95 MG/DL (ref 65–99)
GLUCOSE SERPL-MCNC: 95 MG/DL (ref 65–140)
HCT VFR BLD AUTO: 39.5 % (ref 36.5–49.3)
HGB BLD-MCNC: 13.3 G/DL (ref 12–17)
MCH RBC QN AUTO: 30.6 PG (ref 26.8–34.3)
MCHC RBC AUTO-ENTMCNC: 33.7 G/DL (ref 31.4–37.4)
MCV RBC AUTO: 91 FL (ref 82–98)
PLATELET # BLD AUTO: 273 THOUSANDS/UL (ref 149–390)
PMV BLD AUTO: 8.9 FL (ref 8.9–12.7)
POTASSIUM SERPL-SCNC: 3.6 MMOL/L (ref 3.5–5.3)
RBC # BLD AUTO: 4.35 MILLION/UL (ref 3.88–5.62)
SODIUM SERPL-SCNC: 137 MMOL/L (ref 135–147)
WBC # BLD AUTO: 9.16 THOUSAND/UL (ref 4.31–10.16)

## 2025-07-05 PROCEDURE — 85027 COMPLETE CBC AUTOMATED: CPT

## 2025-07-05 PROCEDURE — 80048 BASIC METABOLIC PNL TOTAL CA: CPT

## 2025-07-05 PROCEDURE — 99232 SBSQ HOSP IP/OBS MODERATE 35: CPT | Performed by: INTERNAL MEDICINE

## 2025-07-05 PROCEDURE — 82330 ASSAY OF CALCIUM: CPT

## 2025-07-05 RX ORDER — PHENAZOPYRIDINE HYDROCHLORIDE 200 MG/1
200 TABLET, FILM COATED ORAL
Qty: 10 TABLET | Refills: 0 | Status: SHIPPED | OUTPATIENT
Start: 2025-07-06 | End: 2025-07-06

## 2025-07-05 RX ORDER — OXYBUTYNIN CHLORIDE 5 MG/1
5 TABLET, EXTENDED RELEASE ORAL DAILY
Qty: 10 TABLET | Refills: 0 | OUTPATIENT
Start: 2025-07-05 | End: 2025-07-15

## 2025-07-05 RX ORDER — CEPHALEXIN 500 MG/1
500 CAPSULE ORAL EVERY 6 HOURS SCHEDULED
Qty: 8 CAPSULE | Refills: 0 | Status: SHIPPED | OUTPATIENT
Start: 2025-07-05 | End: 2025-07-06

## 2025-07-05 RX ORDER — KETOROLAC TROMETHAMINE 30 MG/ML
15 INJECTION, SOLUTION INTRAMUSCULAR; INTRAVENOUS ONCE
Status: COMPLETED | OUTPATIENT
Start: 2025-07-05 | End: 2025-07-05

## 2025-07-05 RX ORDER — ACETAMINOPHEN 10 MG/ML
1000 INJECTION, SOLUTION INTRAVENOUS ONCE
Status: COMPLETED | OUTPATIENT
Start: 2025-07-05 | End: 2025-07-05

## 2025-07-05 RX ORDER — PHENAZOPYRIDINE HYDROCHLORIDE 100 MG/1
100 TABLET, FILM COATED ORAL 3 TIMES DAILY
Qty: 6 TABLET | Refills: 0 | OUTPATIENT
Start: 2025-07-05 | End: 2025-07-07

## 2025-07-05 RX ORDER — HYDROMORPHONE HCL IN WATER/PF 6 MG/30 ML
0.2 PATIENT CONTROLLED ANALGESIA SYRINGE INTRAVENOUS EVERY 4 HOURS PRN
Status: CANCELLED | OUTPATIENT
Start: 2025-07-05

## 2025-07-05 RX ORDER — OXYBUTYNIN CHLORIDE 10 MG/1
10 TABLET, EXTENDED RELEASE ORAL DAILY
Qty: 30 TABLET | Refills: 1 | Status: SHIPPED | OUTPATIENT
Start: 2025-07-06 | End: 2025-07-06

## 2025-07-05 RX ORDER — PHENAZOPYRIDINE HYDROCHLORIDE 100 MG/1
200 TABLET, FILM COATED ORAL
Status: DISCONTINUED | OUTPATIENT
Start: 2025-07-05 | End: 2025-07-06 | Stop reason: HOSPADM

## 2025-07-05 RX ORDER — OXYBUTYNIN CHLORIDE 5 MG/1
10 TABLET, EXTENDED RELEASE ORAL DAILY
Status: DISCONTINUED | OUTPATIENT
Start: 2025-07-06 | End: 2025-07-06 | Stop reason: HOSPADM

## 2025-07-05 RX ORDER — CEPHALEXIN 500 MG/1
500 CAPSULE ORAL EVERY 6 HOURS SCHEDULED
Qty: 8 CAPSULE | Refills: 0 | Status: CANCELLED | OUTPATIENT
Start: 2025-07-06 | End: 2025-07-08

## 2025-07-05 RX ORDER — HYDROMORPHONE HCL IN WATER/PF 6 MG/30 ML
0.2 PATIENT CONTROLLED ANALGESIA SYRINGE INTRAVENOUS ONCE
Status: COMPLETED | OUTPATIENT
Start: 2025-07-05 | End: 2025-07-05

## 2025-07-05 RX ORDER — TAMSULOSIN HYDROCHLORIDE 0.4 MG/1
0.4 CAPSULE ORAL
Qty: 21 CAPSULE | Refills: 0 | Status: SHIPPED | OUTPATIENT
Start: 2025-07-06 | End: 2025-07-06

## 2025-07-05 RX ADMIN — PHENAZOPYRIDINE 100 MG: 100 TABLET ORAL at 09:01

## 2025-07-05 RX ADMIN — OXYCODONE 5 MG: 5 TABLET ORAL at 12:25

## 2025-07-05 RX ADMIN — CEPHALEXIN 500 MG: 500 CAPSULE ORAL at 21:47

## 2025-07-05 RX ADMIN — OXYCODONE 5 MG: 5 TABLET ORAL at 18:07

## 2025-07-05 RX ADMIN — PHENAZOPYRIDINE 200 MG: 100 TABLET ORAL at 17:17

## 2025-07-05 RX ADMIN — KETOROLAC TROMETHAMINE 15 MG: 30 INJECTION, SOLUTION INTRAMUSCULAR; INTRAVENOUS at 19:36

## 2025-07-05 RX ADMIN — HYDROMORPHONE HYDROCHLORIDE 0.2 MG: 0.2 INJECTION, SOLUTION INTRAMUSCULAR; INTRAVENOUS; SUBCUTANEOUS at 06:05

## 2025-07-05 RX ADMIN — OXYBUTYNIN CHLORIDE 5 MG: 5 TABLET, EXTENDED RELEASE ORAL at 09:01

## 2025-07-05 RX ADMIN — TAMSULOSIN HYDROCHLORIDE 0.4 MG: 0.4 CAPSULE ORAL at 17:17

## 2025-07-05 RX ADMIN — CEPHALEXIN 500 MG: 500 CAPSULE ORAL at 09:01

## 2025-07-05 RX ADMIN — PHENAZOPYRIDINE 100 MG: 100 TABLET ORAL at 12:40

## 2025-07-05 RX ADMIN — ACETAMINOPHEN 1000 MG: 10 INJECTION INTRAVENOUS at 06:14

## 2025-07-05 RX ADMIN — SODIUM CHLORIDE 125 ML/HR: 0.9 INJECTION, SOLUTION INTRAVENOUS at 00:31

## 2025-07-05 NOTE — PLAN OF CARE
Problem: PAIN - ADULT  Goal: Verbalizes/displays adequate comfort level or baseline comfort level  Description: Interventions:  - Encourage patient to monitor pain and request assistance  - Assess pain using appropriate pain scale  - Administer analgesics as ordered based on type and severity of pain and evaluate response  - Implement non-pharmacological measures as appropriate and evaluate response  - Consider cultural and social influences on pain and pain management  - Notify physician/advanced practitioner if interventions unsuccessful or patient reports new pain  - Educate patient/family on pain management process including their role and importance of  reporting pain   - Provide non-pharmacologic/complimentary pain relief interventions  Outcome: Progressing     Problem: INFECTION - ADULT  Goal: Absence or prevention of progression during hospitalization  Description: INTERVENTIONS:  - Assess and monitor for signs and symptoms of infection  - Monitor lab/diagnostic results  - Monitor all insertion sites, i.e. indwelling lines, tubes, and drains  - Monitor endotracheal if appropriate and nasal secretions for changes in amount and color  - [place] appropriate cooling/warming therapies per order  - Administer medications as ordered  - Instruct and encourage patient and family to use good hand hygiene technique  - Identify and instruct in appropriate isolation precautions for identified infection/condition  Outcome: Progressing  Goal: Absence of fever/infection during neutropenic period  Description: INTERVENTIONS:  - Monitor WBC  - Perform strict hand hygiene  - Limit to healthy visitors only  - No plants, dried, fresh or silk flowers with anderson in patient room  Outcome: Progressing     Problem: DISCHARGE PLANNING  Goal: Discharge to home or other facility with appropriate resources  Description: INTERVENTIONS:  - Identify barriers to discharge w/patient and caregiver  - Arrange for needed discharge resources  and transportation as appropriate  - Identify discharge learning needs (meds, wound care, etc.)  - Arrange for interpretive services to assist at discharge as needed  - Refer to Case Management Department for coordinating discharge planning if the patient needs post-hospital services based on physician/advanced practitioner order or complex needs related to functional status, cognitive ability, or social support system  Outcome: Progressing     Problem: Knowledge Deficit  Goal: Patient/family/caregiver demonstrates understanding of disease process, treatment plan, medications, and discharge instructions  Description: Complete learning assessment and assess knowledge base.  Interventions:  - Provide teaching at level of understanding  - Provide teaching via preferred learning methods  Outcome: Progressing

## 2025-07-05 NOTE — TELEPHONE ENCOUNTER
Mr. Angelo went into postop retention and is status post Low catheter insertion.  Will be discharged by the medical team with Low in situ.  If cystoscopy stent removal will be greater than 10 days, please bring patient in for T OV.  Thank you.

## 2025-07-05 NOTE — PLAN OF CARE
Problem: PAIN - ADULT  Goal: Verbalizes/displays adequate comfort level or baseline comfort level  Description: Interventions:  - Encourage patient to monitor pain and request assistance  - Assess pain using appropriate pain scale  - Administer analgesics as ordered based on type and severity of pain and evaluate response  - Implement non-pharmacological measures as appropriate and evaluate response  - Consider cultural and social influences on pain and pain management  - Notify physician/advanced practitioner if interventions unsuccessful or patient reports new pain  - Educate patient/family on pain management process including their role and importance of  reporting pain   - Provide non-pharmacologic/complimentary pain relief interventions  Outcome: Progressing     Problem: INFECTION - ADULT  Goal: Absence or prevention of progression during hospitalization  Description: INTERVENTIONS:  - Assess and monitor for signs and symptoms of infection  - Monitor lab/diagnostic results  - Monitor all insertion sites, i.e. indwelling lines, tubes, and drains  - Monitor endotracheal if appropriate and nasal secretions for changes in amount and color  - [place] appropriate cooling/warming therapies per order  - Administer medications as ordered  - Instruct and encourage patient and family to use good hand hygiene technique  - Identify and instruct in appropriate isolation precautions for identified infection/condition  Outcome: Progressing  Goal: Absence of fever/infection during neutropenic period  Description: INTERVENTIONS:  - Monitor WBC  - Perform strict hand hygiene  - Limit to healthy visitors only  - No plants, dried, fresh or silk flowers with anderson in patient room  Outcome: Progressing     Problem: DISCHARGE PLANNING  Goal: Discharge to home or other facility with appropriate resources  Description: INTERVENTIONS:  - Identify barriers to discharge w/patient and caregiver  - Arrange for needed discharge resources  and transportation as appropriate  - Identify discharge learning needs (meds, wound care, etc.)  - Arrange for interpretive services to assist at discharge as needed  - Refer to Case Management Department for coordinating discharge planning if the patient needs post-hospital services based on physician/advanced practitioner order or complex needs related to functional status, cognitive ability, or social support system  Outcome: Progressing     Problem: Knowledge Deficit  Goal: Patient/family/caregiver demonstrates understanding of disease process, treatment plan, medications, and discharge instructions  Description: Complete learning assessment and assess knowledge base.  Interventions:  - Provide teaching at level of understanding  - Provide teaching via preferred learning methods  Outcome: Progressing

## 2025-07-05 NOTE — UTILIZATION REVIEW
Initial Clinical Review  [facility] 7/3/2025 1544  CONVERTED TO  INPATIENT ADMISSION 7/5/2025 0733   DUE TO POST OP DAY #1 s/p left ureteroscopy and stone extraction with stent placement.   WITH POST OP URINARY RETENTION FAILING VOID TRIAL W SUPRAPUBIC PAIN    Admission: Date/Time/Statement:   Admission Orders (From admission, onward)       Ordered        07/05/25 0733  INPATIENT ADMISSION  Once                       07/03/25 1544  Outpatient No Charge Bed  Once        Transfer Service: Hospitalist    07/03/25 1543            Orders Placed This Encounter   Procedures    INPATIENT ADMISSION     Standing Status:   Standing     Number of Occurrences:   1     Level of Care:   Med Surg [16]     Estimated length of stay:   More than 2 Midnights     Certification:   I certify that inpatient services are medically necessary for this patient for a duration of greater than two midnights. See H&P and MD Progress Notes for additional information about the patient's course of treatment.     Elective Inpatient surgical procedure  Age/Sex: 69 y.o. male  Surgery Date: 7/3/2025  Procedure: Left - CYSTOSCOPY URETEROSCOPY WITH LITHOTRIPSY HOLMIUM LASER. RETROGRADE PYELOGRAM AND INSERTION STENT URETERAL     Anesthesia: general   Operative Findings: Trilobar prostatic hypertrophy.  Large capacity bladder mild trabeculations.  Anteriorly displaced ureteral orifices close to the bladder neck.  Distal ureteral stone basketed with ease.  Moderate hydronephrosis on retrograde pyelogram       POD#1 Progress Note: POD#1 s/p left ureteroscopy and stone extraction with stent placement. Hematuria,   Cont antibx, pain regimen, OP stent removal; Low DC & void trial   7/4 Attending  OR 7/3 for cystoscopy and stenting  Per Urology recs Void  trial on 7/4,   Abx & pain regimen  Void trial: output of 25 mL dark red urine with PVR of 736     7/4/2025  LATE PM Attending  Low catheter was removed and a void trial was unsuccessful.   Difficulty urinating and  only had small amount of urination. Discharge was canceled. Continue to be observed here with the void trial     7/5/2025 Changed to Inpatient  Failed void trial overnight; + suprapubic pain, bladder spasms;  Low replaced.  Cont pain management, w management of spasm, antibx    Diet: regular  Mobility: OOB Chair  DVT Prophylaxis: SCD    Medications/Pain Control:   Scheduled Medications:  IV 7/3 x1=  ceFAZolin (ANCEF) IVPB (premix in dextrose) 2,000 mg 50 mL  Dose: 2,000 mg  Freq: Once Route: IV  Indications of Use: PROPHYLAXIS  Start: 07/03/25 1400 End: 07/03/25 1605     7/3 x1 IV, 7/4 IV x1=  ceFAZolin (ANCEF) IVPB (premix in dextrose) 1,000 mg 50 mL  Dose: 1,000 mg  Freq: Every 8 hours Route: IV  Indications of Use: PROPHYLAXIS  Start: 07/04/25 0000 End: 07/04/25 0915    cephalexin, 500 mg, Oral, Q12H TRACY  docusate sodium, 100 mg, Oral, BID  oxybutynin, 5 mg, Oral, Daily  phenazopyridine, 100 mg, Oral, TID With Meals  polyethylene glycol, 17 g, Oral, Daily  senna, 1 tablet, Oral, Daily  tamsulosin, 0.4 mg, Oral, Daily With Dinner      Continuous IV Infusions:  sodium chloride, 125 mL/hr, Intravenous, Continuous      PRN Meds:  acetaminophen, 650 mg, Oral, Q6H PRN  HYDROmorphone, 0.5 mg, Intravenous, Q4H PRN  hyoscyamine, 0.125 mg, Sublingual, Q4H PRN  ondansetron, 4 mg, Intravenous, Q6H PRN  oxyCODONE, 5 mg, Oral, Q4H PRN  oxyCODONE, 2.5 mg, Oral, Q4H PRN      Vital Signs (last 3 days)       Date/Time Temp Pulse Resp BP MAP (mmHg) SpO2 O2 Flow Rate (L/min) O2 Device Cardiac (WDL) Patient Position - Orthostatic VS Pain    07/05/25 06:33:58 97.8 °F (36.6 °C) 66 18 138/92 107 88 % -- -- -- -- --    07/05/25 0605 -- -- -- -- -- -- -- -- -- -- 9    07/05/25 0049 -- -- -- -- -- -- -- -- -- -- No Pain    07/04/25 22:40:47 98.7 °F (37.1 °C) 74 -- 136/90 105 92 % -- -- -- -- --    07/04/25 22:40:29 98.7 °F (37.1 °C) 71 -- 136/90 105 91 % -- -- -- -- --    07/04/25 19:43:23 98.3 °F (36.8 °C) 66 -- 145/92 110 86 % -- -- --  -- --    07/04/25 1830 -- -- -- -- -- -- -- None (Room air) -- -- No Pain    07/04/25 15:15:18 98.4 °F (36.9 °C) 68 14 136/93 107 90 % -- None (Room air) -- Lying --    07/04/25 1128 -- -- -- -- -- -- -- -- -- -- 6    07/04/25 0845 -- -- -- -- -- -- -- -- -- -- No Pain    07/04/25 08:32:20 98.6 °F (37 °C) 73 18 155/99 118 92 % -- -- -- -- --    07/04/25 03:16:38 98.2 °F (36.8 °C) 72 18 121/82 95 91 % -- -- -- -- --    07/04/25 0000 -- 79 -- -- -- 89 % -- -- -- -- --    07/03/25 22:14:54 98.1 °F (36.7 °C) 85 18 131/82 98 90 % -- -- -- -- --    07/03/25 2200 -- 85 -- -- -- 89 % -- -- -- -- --    07/03/25 2000 -- -- -- -- -- -- -- -- -- -- No Pain    07/03/25 1950 -- 93 -- 130/76 94 90 % -- -- -- -- --    07/03/25 1940 -- 90 -- -- -- 90 % -- -- -- -- --    07/03/25 1930 -- 83 -- 143/87 106 90 % -- -- -- -- --    07/03/25 1920 -- 91 -- -- -- 90 % -- -- -- -- --    07/03/25 1910 -- 85 -- 133/80 98 89 % -- -- -- -- --    07/03/25 1900 -- 81 -- 131/85 100 90 % -- -- -- -- --    07/03/25 1850 -- 86 -- -- -- 89 % -- -- -- -- --    07/03/25 1840 -- 82 -- 119/75 90 87 % -- -- -- -- --    07/03/25 1830 -- 84 -- 110/73 85 87 % -- -- -- -- --    07/03/25 1820 -- 86 -- -- -- 87 % -- -- -- -- --    07/03/25 1810 -- 88 -- 134/80 98 87 % -- -- -- -- --    07/03/25 1800 -- 89 -- 123/86 98 88 % -- -- -- -- --    07/03/25 17:32:46 99.3 °F (37.4 °C) 97 -- 133/83 100 88 % -- -- -- -- --    07/03/25 1720 -- 96 -- -- -- 92 % -- -- -- -- --    07/03/25 1715 -- 98 21 112/79 90 94 % -- None (Room air) -- -- No Pain    07/03/25 1710 -- 90 8 -- -- 97 % -- -- -- -- --    07/03/25 1700 -- 90 12 110/80 92 92 % -- -- -- -- --    07/03/25 1658 98.6 °F (37 °C) 89 12 128/84 100 96 % 6 L/min Simple mask WDL -- --    07/03/25 1510 100.4 °F (38 °C) 88 20 168/110 -- 97 % -- None (Room air) -- -- 7    07/03/25 1013 -- -- -- -- -- -- -- -- -- -- 10 - Worst Possible Pain    07/03/25 1012 -- -- -- -- -- -- -- -- -- -- 10 - Worst Possible Pain    07/03/25 0810  -- -- -- -- -- -- -- -- -- -- 10 - Worst Possible Pain    07/03/25 0739 -- -- -- -- -- -- -- -- -- -- 10 - Worst Possible Pain    07/03/25 07:24:02 97.3 °F (36.3 °C) 75 20 168/95 119 95 % -- -- -- -- --    07/03/25 0038 -- -- -- -- -- -- -- -- -- -- 7 07/03/25 0031 -- -- -- -- -- -- -- -- -- -- 7 07/02/25 22:36:34 98.8 °F (37.1 °C) 79 -- 132/81 98 93 % -- -- -- -- --    07/02/25 2000 -- -- -- -- -- -- -- None (Room air) -- -- 5 07/02/25 1850 -- -- -- -- -- -- -- -- -- -- 8    07/02/25 14:45:22 100.3 °F (37.9 °C) 88 -- 132/88 103 93 % -- -- -- -- --    07/02/25 14:45:03 100.3 °F (37.9 °C) 89 -- 132/88 103 95 % -- -- -- -- --    07/02/25 1200 -- -- -- -- -- -- -- -- -- -- 4    07/02/25 11:35:21 98.2 °F (36.8 °C) 85 -- 152/102 119 96 % -- -- -- -- --    07/02/25 1122 -- 74 16 149/82 -- 98 % -- None (Room air) -- Lying 9 07/02/25 0937 -- -- -- -- -- -- -- -- -- -- 1    07/02/25 0909 -- 77 16 132/80 101 93 % -- -- -- -- --    07/02/25 0907 -- -- -- -- -- -- -- -- -- -- 9 07/02/25 0820 -- 77 18 165/102 128 96 % -- -- -- -- --    07/02/25 0817 -- -- -- -- -- -- -- -- -- -- 9 07/02/25 0717 98 °F (36.7 °C) 87 18 159/100 124 92 % -- None (Room air) -- Sitting --          Weight (last 2 days)       None            Pertinent Labs/Diagnostic Test Results:   Radiology:  CT abdomen pelvis with contrast   Final Interpretation by Mateo Miles MD (07/02 0841)      5 mm distal left ureteral calculus at the vesicoureteral junction eliciting left renal inflammation and mild left hydroureteronephrosis.         Workstation performed: [id number]         FL retrograde pyelogram    (Results Pending)     Cardiology:  ECG 12 lead   Final Result by Terrence Luong MD (07/02 0924)   Sinus rhythm with occasional Premature ventricular complexes   Otherwise normal ECG   No previous ECGs available   Confirmed by Terrence Luong ([id number]) on 7/2/2025 9:24:19 AM        GI:  No orders to display           Results from last 7 days  "  Lab Units 07/05/25 0636 07/04/25 0318 07/03/25 0343 07/02/25  0744   WBC Thousand/uL 9.16 14.13* 13.78* 14.64*   HEMOGLOBIN g/dL 13.3 13.5 13.4 15.5   HEMATOCRIT % 39.5 41.0 40.9 47.4   PLATELETS Thousands/uL 273 256 244 265   TOTAL NEUT ABS Thousands/µL  --   --   --  12.43*         Results from last 7 days   Lab Units 07/05/25 0636 07/04/25 0318 07/03/25 0343 07/02/25  0744   SODIUM mmol/L 137 134* 137 134*   POTASSIUM mmol/L 3.6 4.4 3.9 3.5   CHLORIDE mmol/L 106 105 106 103   CO2 mmol/L 25 25 27 25   ANION GAP mmol/L 6 4 4 6   BUN mg/dL 15 23 18 19   CREATININE mg/dL 0.72 1.09 1.20 1.21   EGFR ml/min/1.73sq m 96 69 62 61   CALCIUM mg/dL 7.9* 7.9* 8.1* 8.7   MAGNESIUM mg/dL  --  1.9 1.7*  --      Results from last 7 days   Lab Units 07/02/25  0744   AST U/L 14   ALT U/L 13   ALK PHOS U/L 62   TOTAL PROTEIN g/dL 7.0   ALBUMIN g/dL 3.8   TOTAL BILIRUBIN mg/dL 0.65         Results from last 7 days   Lab Units 07/05/25 0636 07/04/25 0318 07/03/25 0343 07/02/25  0744   GLUCOSE RANDOM mg/dL 95 137 106 183*         Results from last 7 days   Lab Units 07/02/25  0744   HEMOGLOBIN A1C % 6.0*   EAG mg/dl 126     No results found for: \"BETA-HYDROXYBUTYRATE\"                     Results from last 7 days   Lab Units 07/02/25  1025   CLARITY UA  Clear   COLOR UA  Yellow   SPEC GRAV UA  >=1.050*   PH UA  6.0   GLUCOSE UA mg/dl 100 (1/10%)*   KETONES UA mg/dl Trace*   BLOOD UA  Small*   PROTEIN UA mg/dl 30 (1+)*   NITRITE UA  Negative   BILIRUBIN UA  Negative   UROBILINOGEN UA (BE) mg/dl <2.0   LEUKOCYTES UA  Negative   WBC UA /hpf 1-2   RBC UA /hpf 1-2   BACTERIA UA /hpf None Seen   EPITHELIAL CELLS WET PREP /hpf Occasional   MUCUS THREADS  Innumerable*             Network Utilization Review Department  ATTENTION: Please call with any questions or concerns to [phone] and carefully listen to the prompts so that you are directed to the right person. All voicemails are confidential.   For Discharge needs, contact " Care Management DC Support Team at [phone] opt. 2  Send all requests for admission clinical reviews, approved or denied determinations and any other requests to dedicated fax number below belonging to the campus where the patient is receiving treatment. List of dedicated fax numbers for the Facilities:  FACILITY NAME UR FAX NUMBER   ADMISSION DENIALS (Administrative/Medical Necessity) [phone]   DISCHARGE SUPPORT TEAM (NETWORK) [phone]   PARENT CHILD HEALTH (Maternity/NICU/Pediatrics) [phone]   [facility] [phone]   [facility] [phone]   [facility] [phone]   [facility] [phone]   [facility] [phone]   [facility] [phone]   [facility] [phone]   [facility] [phone]   [facility] [phone]   [facility] [phone]   [facility] [phone]   [facility] [phone]

## 2025-07-05 NOTE — ASSESSMENT & PLAN NOTE
-69 years old male with no significant known past medical history presented yesterday 7/2 with left-sided flank pain and associated symptoms of nausea, vomiting, constipation, difficulty urinating.  Patient is admitted due to uncontrolled pain.  -  Patient presented to [facility] on Monday 6/30 with sharp left flank pain radiating into the lower abdominal with nausea and vomiting.  Patient denied hematuria, fever, chills, history of kidney stones.  Labs reviewed patient did not have leukocytosis or hyperglycemia.  UA showed large blood amount in the urine, proteinuria, trace leukocyte esterase. CT abdomen pelvis w contrast showed:Moderate left-sided hydroureteronephrosis with left perinephric free fluid and induration.  Patient was discharged with pain medications and was told to follow-up with urology.  -7/2 CT abdomen pelvis with contrast remarkable for 5 mm distal left ureteral calculus at the vesicoureteral junction eliciting left renal inflammation and mild left hydroureteronephrosis.  - 7/2: UA remarkable for glucose, trace ketones, blood and protein.  - In the ED was given lactated Ringer bolus, Dilaudid, Toradol.  - 7/3:  CYSTOSCOPY URETEROSCOPY WITH RETROGRADE PYELOGRAM AND INSERTION STENT URETERAL, STONE BASKET EXTRACTION (Left: Bladder). Stable post-op.  - 7/4: After removing bronson, pt had difficulty voiding. Discharge was postponed. PVR at midnight was > 400 mL.  -7/5: Overnight, 20 mL output and . Another void with 325 mL output and PVR of 486. In the morning, had a urine output of 25 mL and PVR of 736. Ton of pain. Pt was straight cathed with a return of brownish red urine with a few clots after straight cath. A bronson catheter was placed again. In the afternoon/evening, the patient had pain and spasms and urine leakage, so discharge was postponed per patient's request to remain overnight. Pt was given PRN pain meds with improvement.     Plan:  - 7/3 cystoscopy  and stenting  - Void trial on 7/4: Attempted, had difficulty urinating. On 7/5, bronson was put back.  - Will d/c with bronson catheter  -Abx (KEFLEX 500 mg) for three days per urology. Until 7/7  -Flomax  -Dilaudid 0.5mg q4 PRN for severe pain  - Oxycodone 2.5 mg for moderate pain and 5 mg for severe  -Tylenol 650 q6 PRN  -Zofran 4mg q6 PRN  -Colace 100mg BID  -Senna 8.6mg daily  -MiraLAX 17 G PACKET Flomax 0.4  - Phenazopyridine 100 mg 3 times daily. Increased to 200 mg TID on 7/5  - Oxybutynin 5 mg. Increased to 10 mg on 7/6 due to continuing bladder spasms

## 2025-07-06 VITALS
TEMPERATURE: 97.8 F | HEART RATE: 63 BPM | RESPIRATION RATE: 16 BRPM | OXYGEN SATURATION: 90 % | HEIGHT: 71 IN | DIASTOLIC BLOOD PRESSURE: 100 MMHG | WEIGHT: 190 LBS | SYSTOLIC BLOOD PRESSURE: 159 MMHG | BODY MASS INDEX: 26.6 KG/M2

## 2025-07-06 PROBLEM — N20.0 NEPHROLITHIASIS: Status: RESOLVED | Noted: 2025-07-02 | Resolved: 2025-07-06

## 2025-07-06 LAB
ANION GAP SERPL CALCULATED.3IONS-SCNC: 5 MMOL/L (ref 4–13)
BUN SERPL-MCNC: 13 MG/DL (ref 5–25)
CALCIUM SERPL-MCNC: 8.3 MG/DL (ref 8.4–10.2)
CHLORIDE SERPL-SCNC: 104 MMOL/L (ref 96–108)
CO2 SERPL-SCNC: 27 MMOL/L (ref 21–32)
CREAT SERPL-MCNC: 0.71 MG/DL (ref 0.6–1.3)
ERYTHROCYTE [DISTWIDTH] IN BLOOD BY AUTOMATED COUNT: 14.1 % (ref 11.6–15.1)
GFR SERPL CREATININE-BSD FRML MDRD: 97 ML/MIN/1.73SQ M
GLUCOSE SERPL-MCNC: 89 MG/DL (ref 65–140)
HCT VFR BLD AUTO: 40.6 % (ref 36.5–49.3)
HGB BLD-MCNC: 13.4 G/DL (ref 12–17)
MCH RBC QN AUTO: 30 PG (ref 26.8–34.3)
MCHC RBC AUTO-ENTMCNC: 33 G/DL (ref 31.4–37.4)
MCV RBC AUTO: 91 FL (ref 82–98)
PLATELET # BLD AUTO: 288 THOUSANDS/UL (ref 149–390)
PMV BLD AUTO: 9 FL (ref 8.9–12.7)
POTASSIUM SERPL-SCNC: 4 MMOL/L (ref 3.5–5.3)
RBC # BLD AUTO: 4.47 MILLION/UL (ref 3.88–5.62)
SODIUM SERPL-SCNC: 136 MMOL/L (ref 135–147)
WBC # BLD AUTO: 7.46 THOUSAND/UL (ref 4.31–10.16)

## 2025-07-06 PROCEDURE — 85027 COMPLETE CBC AUTOMATED: CPT

## 2025-07-06 PROCEDURE — 80048 BASIC METABOLIC PNL TOTAL CA: CPT

## 2025-07-06 PROCEDURE — 99239 HOSP IP/OBS DSCHRG MGMT >30: CPT | Performed by: INTERNAL MEDICINE

## 2025-07-06 RX ORDER — OXYBUTYNIN CHLORIDE 10 MG/1
10 TABLET, EXTENDED RELEASE ORAL DAILY
Qty: 30 TABLET | Refills: 0 | Status: SHIPPED | OUTPATIENT
Start: 2025-07-06 | End: 2025-07-16

## 2025-07-06 RX ORDER — TAMSULOSIN HYDROCHLORIDE 0.4 MG/1
0.4 CAPSULE ORAL
Qty: 21 CAPSULE | Refills: 0 | Status: SHIPPED | OUTPATIENT
Start: 2025-07-06 | End: 2025-07-14

## 2025-07-06 RX ORDER — CEPHALEXIN 500 MG/1
500 CAPSULE ORAL EVERY 6 HOURS SCHEDULED
Qty: 5 CAPSULE | Refills: 0 | Status: SHIPPED | OUTPATIENT
Start: 2025-07-06 | End: 2025-07-08

## 2025-07-06 RX ORDER — PHENAZOPYRIDINE HYDROCHLORIDE 200 MG/1
200 TABLET, FILM COATED ORAL
Qty: 6 TABLET | Refills: 0 | Status: SHIPPED | OUTPATIENT
Start: 2025-07-06 | End: 2025-07-08

## 2025-07-06 RX ADMIN — OXYBUTYNIN CHLORIDE 10 MG: 5 TABLET, EXTENDED RELEASE ORAL at 09:02

## 2025-07-06 RX ADMIN — PHENAZOPYRIDINE 200 MG: 100 TABLET ORAL at 12:42

## 2025-07-06 RX ADMIN — PHENAZOPYRIDINE 200 MG: 100 TABLET ORAL at 09:03

## 2025-07-06 RX ADMIN — CEPHALEXIN 500 MG: 500 CAPSULE ORAL at 09:02

## 2025-07-06 NOTE — ASSESSMENT & PLAN NOTE
-69 years old male with no significant known past medical history presented yesterday 7/2 with left-sided flank pain and associated symptoms of nausea, vomiting, constipation, difficulty urinating.  Patient is admitted due to uncontrolled pain.  -  Patient presented to [facility] on Monday 6/30 with sharp left flank pain radiating into the lower abdominal with nausea and vomiting.  Patient denied hematuria, fever, chills, history of kidney stones.  Labs reviewed patient did not have leukocytosis or hyperglycemia.  UA showed large blood amount in the urine, proteinuria, trace leukocyte esterase. CT abdomen pelvis w contrast showed:Moderate left-sided hydroureteronephrosis with left perinephric free fluid and induration.  Patient was discharged with pain medications and was told to follow-up with urology.  -7/2 CT abdomen pelvis with contrast remarkable for 5 mm distal left ureteral calculus at the vesicoureteral junction eliciting left renal inflammation and mild left hydroureteronephrosis.  - 7/2: UA remarkable for glucose, trace ketones, blood and protein.  - In the ED was given lactated Ringer bolus, Dilaudid, Toradol.  - 7/3:  CYSTOSCOPY URETEROSCOPY WITH RETROGRADE PYELOGRAM AND INSERTION STENT URETERAL, STONE BASKET EXTRACTION (Left: Bladder). Stable post-op  - 7/4: Overnight post procedure and on the day following procedure (7/4), pt had dark red urine. Urology evaluated the patient, and he was found to be stable.  Void trial attempted.  Patient is having difficulty urinating.  -7/5: Overnight, 20 mL output with . Another void with 325 mL output with PVR of 486. In the morning, had a urine output of 25 mL with PVR of 736. Ton of pain. Pt was straight cathed with a return of 825cc brownish red urine with a few clots after straight cath. A bronson catheter was placed again. In the afternoon, the patient had pain and spasms, so discharge was postponed per patient's request  to remain overnight.    Plan:  - 7/3 cystoscopy and stenting  - Void trial on 7/4: Attempted, had difficulty urinating. On 7/5, bronson was put back.  - Will d/c with bronson catheter  -Dilaudid 0.5mg q4 PRN for severe pain  - Oxycodone 5 mg for moderate pain  -Tylenol 650 q6 PRN  -Zofran 4mg q6 PRN  -Colace 100mg BID  -Senna 8.6mg daily  -MiraLAX 17 G PACKET Flomax 0.4  - Phenazopyridine 100 mg 3 times daily.  - Oxybutynin 5 mg.

## 2025-07-06 NOTE — ASSESSMENT & PLAN NOTE
-69 years old male with no significant known past medical history presented yesterday 7/2 with left-sided flank pain and associated symptoms of nausea, vomiting, constipation, difficulty urinating.  Patient is admitted due to uncontrolled pain.  -  Patient presented to [facility] on Monday 6/30 with sharp left flank pain radiating into the lower abdominal with nausea and vomiting.  Patient denied hematuria, fever, chills, history of kidney stones.  Labs reviewed patient did not have leukocytosis or hyperglycemia.  UA showed large blood amount in the urine, proteinuria, trace leukocyte esterase. CT abdomen pelvis w contrast showed:Moderate left-sided hydroureteronephrosis with left perinephric free fluid and induration.  Patient was discharged with pain medications and was told to follow-up with urology.  -7/2 CT abdomen pelvis with contrast remarkable for 5 mm distal left ureteral calculus at the vesicoureteral junction eliciting left renal inflammation and mild left hydroureteronephrosis.  - 7/2: UA remarkable for glucose, trace ketones, blood and protein.  - In the ED was given lactated Ringer bolus, Dilaudid, Toradol.  - 7/3:  CYSTOSCOPY URETEROSCOPY WITH RETROGRADE PYELOGRAM AND INSERTION STENT URETERAL, STONE BASKET EXTRACTION (Left: Bladder). Stable post-op  - 7/4: Overnight post procedure and on the day following procedure (7/4), pt had dark red urine. Urology evaluated the patient, and he was found to be stable.  Void trial attempted.  Patient is having difficulty urinating.  -7/5: Overnight, 20 mL output with . Another void with 325 mL output with PVR of 486. In the morning, had a urine output of 25 mL with PVR of 736. Ton of pain. Pt was straight cathed with a return of 825cc brownish red urine with a few clots after straight cath. A bronson catheter was placed again. In the afternoon, the patient had pain and spasms, so discharge was postponed per patient's request  to remain overnight.    Plan:  Stable for discharge  Will follow-up with urology in 2 weeks  Keep Low catheter-patient has supplies at bedside.  Tylenol as needed for pain  Continue laxatives for constipation  Continue Pyridium and oxybutynin  Continue tamsulosin

## 2025-07-06 NOTE — DISCHARGE SUMMARY
Discharge Summary - Hospitalist   Name: Cj Agnelo 69 y.o. male I MRN: [id number]  Unit/Bed#: W -01 I Date of Admission: 7/2/2025   Date of Service: 7/6/2025 I Hospital Day: 1     Assessment & Plan  Obstructive Nephrolithasis  -69 years old male with no significant known past medical history presented yesterday 7/2 with left-sided flank pain and associated symptoms of nausea, vomiting, constipation, difficulty urinating.  Patient is admitted due to uncontrolled pain.  -  Patient presented to [facility] on Monday 6/30 with sharp left flank pain radiating into the lower abdominal with nausea and vomiting.  Patient denied hematuria, fever, chills, history of kidney stones.  Labs reviewed patient did not have leukocytosis or hyperglycemia.  UA showed large blood amount in the urine, proteinuria, trace leukocyte esterase. CT abdomen pelvis w contrast showed:Moderate left-sided hydroureteronephrosis with left perinephric free fluid and induration.  Patient was discharged with pain medications and was told to follow-up with urology.  -7/2 CT abdomen pelvis with contrast remarkable for 5 mm distal left ureteral calculus at the vesicoureteral junction eliciting left renal inflammation and mild left hydroureteronephrosis.  - 7/2: UA remarkable for glucose, trace ketones, blood and protein.  - In the ED was given lactated Ringer bolus, Dilaudid, Toradol.  - 7/3:  CYSTOSCOPY URETEROSCOPY WITH RETROGRADE PYELOGRAM AND INSERTION STENT URETERAL, STONE BASKET EXTRACTION (Left: Bladder). Stable post-op  - 7/4: Overnight post procedure and on the day following procedure (7/4), pt had dark red urine. Urology evaluated the patient, and he was found to be stable.  Void trial attempted.  Patient is having difficulty urinating.  -7/5: Overnight, 20 mL output with . Another void with 325 mL output with PVR of 486. In the morning, had a urine output of 25 mL with PVR of 736. Ton of pain. Pt  was straight cathed with a return of 825cc brownish red urine with a few clots after straight cath. A bronson catheter was placed again. In the afternoon, the patient had pain and spasms, so discharge was postponed per patient's request to remain overnight.    Plan:  Stable for discharge  Will follow-up with urology in 2 weeks  Keep Bronson catheter-patient has supplies at bedside.  Tylenol as needed for pain  Continue laxatives for constipation  Continue Pyridium and oxybutynin  Continue tamsulosin    Pre-diabetes  -A1c due to glucose in urine and elevated glucose of 183 on 7/2 on admission  - Hemoglobin A1c is 6 on 7/2. Prediabetic     Medical Problems       Resolved Problems  Date Reviewed: 7/6/2025          Resolved    Hypomagnesemia 7/4/2025     Resolved by  Awais Bryant MD    Elevated BP without diagnosis of hypertension 7/4/2025     Resolved by  Awais Bryant MD    Leukocytosis 7/5/2025     Resolved by  Awais Bryant MD        Discharging Physician / Practitioner: Beth Trevino MD  PCP: No primary care provider on file.  Admission Date:   Admission Orders (From admission, onward)       Ordered        07/05/25 0733  INPATIENT ADMISSION  Once            07/02/25 0858  Place in Observation  Once                          Discharge Date: 07/06/25    Next Steps for Physician/AP Assuming Care:  CBC   BMP   Hemoglobin A1c to follow up on prediabetes  Incidental finding requires GI follow up    Test Results Pending at Discharge (will require follow up):  None    Medication Changes for Discharge & Rationale:   Started Keflex 500 every 6 hours for 1 more day, per urology, until the evening of 7/7  Started phenazopyridine 200 mg, oxybutynin 10, and continuing flomax 0.4 mg to help with stent discomfort and bladder spasms  Miralax and senna as needed for constipation  Zofran as needed for nausea  Tylenol as needed for pain control  See after visit summary for reconciled  discharge medications provided to patient and/or family.     Consultations During Hospital Stay:  Urology    Procedures Performed:   7/3: CYSTOSCOPY URETEROSCOPY WITH RETROGRADE PYELOGRAM AND INSERTION STENT URETERAL, STONE BASKET EXTRACTION (Left: Bladder)    Significant Findings / Test Results:   CT abd pelvis w IV contrast on 7/2: 5 mm distal left ureteral calculus at the vesicoureteral junction. Mild left hydroureteronephrosis. Left perinephric fat stranding. Nonobstructing 6 mm left renal calculus. No right hydroureteronephrosis   UA 7/2: 1+ protein, small blood  A1c of 6 on 7/2    Incidental Findings:   On reviewing external records,  CTA abd pelvis w wo contrast performed on 6/30 showed asymmetric wall thickening of the gastric fundus at the greater curvature. Recommend follow-up with endoscopy to exclude underlying mass versus possible gastritis.     I reviewed the above mentioned incidental findings with the patient and/or family and they expressed understanding.    Hospital Course:   Cj Angelo is a 69 y.o. male patient who originally presented to the hospital on 7/2/2025 due to left-sided flank pain and nausea and vomiting and difficulty urinating. In the ED, the pt was given dilaudid, toradol, and lactated ringer bolus. CT of the abdomen pelvis with contrast was remarkable for 5 mm distal left ureteral calculus at the vesicoureteral junction eliciting left renal inflammation and mild left hydroureteronephrosis. Pt continued to be in pain, so he was admitted because of uncontrolled pain. Given the continued pain, despite dilaudid doses and expulsive therapy with Flomax, urology decided to take the pt to the OR for ureteroscopy with stent insertion on 7/3. Distal ureteral stone was basked with ease per urology. Pt was stable post op. Overnight post procedure and on the day following procedure (7/4), pt had dark red urine. Urology evaluated the patient, and he was found to be stable. Voiding trial  "showed difficulty voiding, so d/c was postponed. PVR was > 400. Bronson catheter was put back on 7/5 due to urinary retention and pain, and on 7/5, the patient had bladder spasms and urine leakage in the afternoon. Pt was given PRN pain meds. On 7/6, patient was seen and examined. Pt did not require PRN pain meds overnight. In the morning, oxybutynin was increased to 10 mg from 5 mg. In coordination with urology, discharge was planned with bronson in place, and a follow up appointment for ureteral stent removal was planned after 2 weeks from discharge    Also, while admitted labs were notable for A1c of 6 on 7/2, Na of 134 on 7/4 which normalized, and Ca of 7.9 on 7/4 which normalized. Recommend PCP follow up.    Please see above list of diagnoses and related plan for additional information.     Discharge Day Visit / Exam:   Subjective:    Overnight events reviewed, discussed with nurse  Patient denies any abdominal pain no fever.  Eating and drinking well abdominal eating well.    Vitals: Blood Pressure: 159/100 (07/06/25 1058)  Pulse: 63 (07/06/25 1058)  Temperature: 97.8 °F (36.6 °C) (07/06/25 1058)  Temp Source: Core (07/05/25 1500)  Respirations: 16 (07/06/25 1058)  Height: 5' 11\" (180.3 cm) (07/02/25 1700)  Weight - Scale: 86.2 kg (190 lb) (07/02/25 1700)  SpO2: 90 % (07/06/25 1058)  Physical Exam  Constitutional:       Appearance: Normal appearance.   HENT:      Head: Normocephalic and atraumatic.      Right Ear: External ear normal.      Left Ear: External ear normal.      Nose: Nose normal.     Eyes:      Conjunctiva/sclera: Conjunctivae normal.       Cardiovascular:      Rate and Rhythm: Normal rate and regular rhythm.   Pulmonary:      Effort: Pulmonary effort is normal.      Breath sounds: Normal breath sounds.   Abdominal:      Tenderness: There is no abdominal tenderness. There is no right CVA tenderness, left CVA tenderness, guarding or rebound.      Hernia: A hernia is present.      Comments: Reducible " midline hernia   Genitourinary:     Comments: Low catheter in place    Musculoskeletal:         General: Normal range of motion.      Right lower leg: No edema.      Left lower leg: No edema.     Skin:     General: Skin is warm.      Coloration: Skin is not jaundiced.     Neurological:      General: No focal deficit present.      Mental Status: He is alert and oriented to person, place, and time.     Psychiatric:         Mood and Affect: Mood normal.         Behavior: Behavior normal.          Discussion with Family: Updated  (wife) via phone.    Discharge instructions/Information to patient and family:   See after visit summary for information provided to patient and family.      Provisions for Follow-Up Care:  See after visit summary for information related to follow-up care and any pertinent home health orders.      Mobility at time of Discharge:   Basic Mobility Inpatient Raw Score: 24  JH-HLM Goal: 8: Walk 250 feet or more  JH-HLM Achieved: 8: Walk 250 feet ot more  HLM Goal achieved. Continue to encourage appropriate mobility.     Disposition:   Home    Planned Readmission: No    Administrative Statements   Discharge Statement:  Per attending attestation     **Please Note: This note may have been constructed using a voice recognition system**

## 2025-07-06 NOTE — PROGRESS NOTES
Progress Note - Hospitalist   Name: Cj Angelo 69 y.o. male I MRN: [id number]  Unit/Bed#: W -01 I Date of Admission: 7/2/2025   Date of Service: 7/5/2025 I Hospital Day: 0    Assessment & Plan  Obstructive Nephrolithasis  -69 years old male with no significant known past medical history presented yesterday 7/2 with left-sided flank pain and associated symptoms of nausea, vomiting, constipation, difficulty urinating.  Patient is admitted due to uncontrolled pain.  -  Patient presented to [facility] on Monday 6/30 with sharp left flank pain radiating into the lower abdominal with nausea and vomiting.  Patient denied hematuria, fever, chills, history of kidney stones.  Labs reviewed patient did not have leukocytosis or hyperglycemia.  UA showed large blood amount in the urine, proteinuria, trace leukocyte esterase. CT abdomen pelvis w contrast showed:Moderate left-sided hydroureteronephrosis with left perinephric free fluid and induration.  Patient was discharged with pain medications and was told to follow-up with urology.  -7/2 CT abdomen pelvis with contrast remarkable for 5 mm distal left ureteral calculus at the vesicoureteral junction eliciting left renal inflammation and mild left hydroureteronephrosis.  - 7/2: UA remarkable for glucose, trace ketones, blood and protein.  - In the ED was given lactated Ringer bolus, Dilaudid, Toradol.  - 7/3:  CYSTOSCOPY URETEROSCOPY WITH RETROGRADE PYELOGRAM AND INSERTION STENT URETERAL, STONE BASKET EXTRACTION (Left: Bladder). Stable post-op  - 7/4: Overnight post procedure and on the day following procedure (7/4), pt had dark red urine. Urology evaluated the patient, and he was found to be stable.  Void trial attempted.  Patient is having difficulty urinating.  -7/5: Overnight, 20 mL output with . Another void with 325 mL output with PVR of 486. In the morning, had a urine output of 25 mL with PVR of 736. Ton of pain. Pt was  straight cathed with a return of 825cc brownish red urine with a few clots after straight cath. A bronson catheter was placed again. In the afternoon, the patient had pain and spasms, so discharge was postponed per patient's request to remain overnight.    Plan:  - 7/3 cystoscopy and stenting  - Void trial on 7/4: Attempted, had difficulty urinating. On 7/5, bronson was put back.  - Will d/c with bronson catheter  -Dilaudid 0.5mg q4 PRN for severe pain  - Oxycodone 5 mg for moderate pain  -Tylenol 650 q6 PRN  -Zofran 4mg q6 PRN  -Colace 100mg BID  -Senna 8.6mg daily  -MiraLAX 17 G PACKET Flomax 0.4  - Phenazopyridine 100 mg 3 times daily.  - Oxybutynin 5 mg.  Pre-diabetes  -A1c due to glucose in urine and elevated glucose of 183 on 7/2 on admission  - Hemoglobin A1c is 6 on 7/2. Prediabetic    VTE Pharmacologic Prophylaxis: VTE Score: 2 Low Risk (Score 0-2) - Encourage Ambulation.    Mobility:   Basic Mobility Inpatient Raw Score: 24  JH-HLM Goal: 8: Walk 250 feet or more  JH-HLM Achieved: 8: Walk 250 feet ot more  JH-HLM Goal achieved. Continue to encourage appropriate mobility.    Patient Centered Rounds: I performed bedside rounds with nursing staff today.   Discussions with Specialists or Other Care Team Provider: urology    Education and Discussions with Family / Patient: Updated  (wife) via phone.    Current Length of Stay: 0 day(s)  Current Patient Status: Inpatient   Certification Statement: The patient will continue to require additional inpatient hospital stay due to management of nephrolithiasis.  Discharge Plan: Anticipate discharge in 24-48 hrs to home.    Code Status: Level 1 - Full Code    Subjective   Overnight, pt had urinary retention with  ->  486. In the morning, Morning, PVR was 736. Pt was straight cathed. Pt reported suprapubic pain overnight that radiated to the flank with straining. Pain resolved after cathing the pt. Denied fever, chills, night sweats, CP, SOB.    Objective  :  Temp:  [97.8 °F (36.6 °C)-98.7 °F (37.1 °C)] 97.9 °F (36.6 °C)  HR:  [66-74] 70  BP: (136-165)/() 160/97  Resp:  [16-18] 18  SpO2:  [88 %-92 %] 89 %    Body mass index is 26.5 kg/m².     Input and Output Summary (last 24 hours):     Intake/Output Summary (Last 24 hours) at 7/5/2025 2238  Last data filed at 7/5/2025 2157  Gross per 24 hour   Intake 2617.91 ml   Output 3282 ml   Net -664.09 ml       Physical Exam  Constitutional:       General: He is not in acute distress.  HENT:      Head: Normocephalic and atraumatic.      Right Ear: External ear normal.      Left Ear: External ear normal.      Nose: Nose normal.     Eyes:      Conjunctiva/sclera: Conjunctivae normal.       Cardiovascular:      Rate and Rhythm: Normal rate and regular rhythm.   Pulmonary:      Breath sounds: Normal breath sounds.   Abdominal:      Tenderness: There is no abdominal tenderness.      Comments: Reducible midline hernia     Musculoskeletal:      Right lower leg: No edema.      Left lower leg: No edema.     Neurological:      Mental Status: He is alert and oriented to person, place, and time.     Psychiatric:         Mood and Affect: Mood normal.         Behavior: Behavior normal.           Lines/Drains:  Lines/Drains/Airways       Active Status       Name Placement date Placement time Site Days    Urethral Catheter Latex;Straight-tip 18 Fr. 07/05/25  1636  Latex;Straight-tip  less than 1    Ureteral Internal Stent Left ureter 4.8 Fr. 07/03/25  1645  Left ureter  2                  Urinary Catheter:  Goal for removal: will reassess on outpatient urology follow up                 Lab Results: I have reviewed the following results:   Results from last 7 days   Lab Units 07/05/25  0636 07/04/25  0318 07/03/25  0343 07/02/25  0744   WBC Thousand/uL 9.16   < > 13.78* 14.64*   HEMOGLOBIN g/dL 13.3   < > 13.4 15.5   HEMATOCRIT % 39.5   < > 40.9 47.4   PLATELETS Thousands/uL 273   < > 244 265   SEGS PCT %  --   --   --  86*   LYMPHO PCT  %  --   --  9* 4*   MONO PCT %  --   --  16* 10   EOS PCT %  --   --  0 0    < > = values in this interval not displayed.     Results from last 7 days   Lab Units 07/05/25  0636 07/03/25  0343 07/02/25  0744   SODIUM mmol/L 137   < > 134*   POTASSIUM mmol/L 3.6   < > 3.5   CHLORIDE mmol/L 106   < > 103   CO2 mmol/L 25   < > 25   BUN mg/dL 15   < > 19   CREATININE mg/dL 0.72   < > 1.21   ANION GAP mmol/L 6   < > 6   CALCIUM mg/dL 7.9*   < > 8.7   ALBUMIN g/dL  --   --  3.8   TOTAL BILIRUBIN mg/dL  --   --  0.65   ALK PHOS U/L  --   --  62   ALT U/L  --   --  13   AST U/L  --   --  14   GLUCOSE RANDOM mg/dL 95   < > 183*    < > = values in this interval not displayed.             Results from last 7 days   Lab Units 07/02/25  0744   HEMOGLOBIN A1C % 6.0*           Recent Cultures (last 7 days):         Imaging Results Review: I personally reviewed the following image studies in PACS and associated radiology reports: CT abdomen/pelvis. My interpretation of the radiology images/reports is: L-sided 5 mm kidney stone.  Other Study Results Review: Other studies reviewed include: ionized Ca, BMP, CBC    Last 24 Hours Medication List:     Current Facility-Administered Medications:     acetaminophen (TYLENOL) tablet 650 mg, Q6H PRN    cephalexin (KEFLEX) capsule 500 mg, Q12H TRACY    docusate sodium (COLACE) capsule 100 mg, BID    HYDROmorphone (DILAUDID) injection 0.5 mg, Q4H PRN    hyoscyamine (LEVSIN/SL) SL tablet 0.125 mg, Q4H PRN    ondansetron (ZOFRAN) injection 4 mg, Q6H PRN    [START ON 7/6/2025] oxybutynin (DITROPAN-XL) 24 hr tablet 10 mg, Daily    oxyCODONE (ROXICODONE) IR tablet 5 mg, Q4H PRN    oxyCODONE (ROXICODONE) split tablet 2.5 mg, Q4H PRN    phenazopyridine (PYRIDIUM) tablet 200 mg, TID With Meals    polyethylene glycol (MIRALAX) packet 17 g, Daily    senna (SENOKOT) tablet 8.6 mg, Daily    tamsulosin (FLOMAX) capsule 0.4 mg, Daily With Dinner    Administrative Statements   Today, Patient Was Seen By: Yousef  MD Gail    **Please Note: This note may have been constructed using a voice recognition system.**

## 2025-07-06 NOTE — UTILIZATION REVIEW
Continued Stay Review    Date: 7/6/2025                          Current Patient Class: Inpatient Current Level of Care: med surg     HPI:69 y.o. male initially admitted on 7/5      Assessment/Plan: Per Urology okay with the discharge  today. Follow up OP Urology in office in 2 wk. Continue to be on Keflex to complete a total of 5-day course, as per recommendation from the urologist. Prescribed with Pyridium, oxybutynin and Flomax. VNA for bronson care  PCP follow up for CBC with differential count, and fasting BMP/CMP, A1c and blood sugars     Medications:   Scheduled Medications:  cephalexin, 500 mg, Oral, Q12H TRACY  docusate sodium, 100 mg, Oral, BID  oxybutynin, 10 mg, Oral, Daily  phenazopyridine, 200 mg, Oral, TID With Meals  polyethylene glycol, 17 g, Oral, Daily  senna, 1 tablet, Oral, Daily  tamsulosin, 0.4 mg, Oral, Daily With Dinner      Continuous IV Infusions:     PRN Meds:  acetaminophen, 650 mg, Oral, Q6H PRN  HYDROmorphone, 0.5 mg, Intravenous, Q4H PRN  hyoscyamine, 0.125 mg, Sublingual, Q4H PRN  ondansetron, 4 mg, Intravenous, Q6H PRN  oxyCODONE, 5 mg, Oral, Q4H PRN  oxyCODONE, 2.5 mg, Oral, Q4H PRN      Discharge Plan: DC 7/6    Vital Signs (last 3 days)       Date/Time Temp Pulse Resp BP MAP (mmHg) SpO2 O2 Flow Rate (L/min) O2 Device Cardiac (WDL) Patient Position - Orthostatic VS Pain    07/06/25 0800 -- -- -- -- -- -- -- -- -- -- No Pain    07/06/25 07:18:54 98.2 °F (36.8 °C) 60 17 150/96 114 90 % -- -- -- -- --    07/06/25 02:54:31 98 °F (36.7 °C) 76 19 135/80 98 90 % -- -- -- -- --    07/05/25 23:00:34 98 °F (36.7 °C) 67 18 145/92 110 92 % -- -- -- -- --    07/05/25 2001 -- -- -- -- -- -- -- -- -- -- 2    07/05/25 19:12:55 97.9 °F (36.6 °C) 70 18 160/97 118 89 % -- -- -- -- --    07/05/25 1807 -- -- -- -- -- -- -- -- -- -- 8    07/05/25 1500 98 °F (36.7 °C) -- 16 148/96 -- -- -- -- -- -- --    07/05/25 1225 -- -- -- -- -- -- -- -- -- -- 7    07/05/25 11:57:28 97.8 °F (36.6 °C) 67 17 165/103 124  92 % -- -- -- -- --    07/05/25 0900 -- -- -- -- -- -- -- -- -- -- No Pain    07/05/25 06:33:58 97.8 °F (36.6 °C) 66 18 138/92 107 88 % -- -- -- -- --    07/05/25 0605 -- -- -- -- -- -- -- -- -- -- 9    07/05/25 0049 -- -- -- -- -- -- -- -- -- -- No Pain    07/04/25 22:40:47 98.7 °F (37.1 °C) 74 -- 136/90 105 92 % -- -- -- -- --    07/04/25 22:40:29 98.7 °F (37.1 °C) 71 -- 136/90 105 91 % -- -- -- -- --    07/04/25 19:43:23 98.3 °F (36.8 °C) 66 -- 145/92 110 86 % -- -- -- -- --    07/04/25 1830 -- -- -- -- -- -- -- None (Room air) -- -- No Pain    07/04/25 15:15:18 98.4 °F (36.9 °C) 68 14 136/93 107 90 % -- None (Room air) -- Lying --    07/04/25 1128 -- -- -- -- -- -- -- -- -- -- 6    07/04/25 0845 -- -- -- -- -- -- -- -- -- -- No Pain    07/04/25 08:32:20 98.6 °F (37 °C) 73 18 155/99 118 92 % -- -- -- -- --    07/04/25 03:16:38 98.2 °F (36.8 °C) 72 18 121/82 95 91 % -- -- -- -- --    07/04/25 0000 -- 79 -- -- -- 89 % -- -- -- -- --    07/03/25 22:14:54 98.1 °F (36.7 °C) 85 18 131/82 98 90 % -- -- -- -- --    07/03/25 2200 -- 85 -- -- -- 89 % -- -- -- -- --    07/03/25 2000 -- -- -- -- -- -- -- -- -- -- No Pain    07/03/25 1950 -- 93 -- 130/76 94 90 % -- -- -- -- --    07/03/25 1940 -- 90 -- -- -- 90 % -- -- -- -- --    07/03/25 1930 -- 83 -- 143/87 106 90 % -- -- -- -- --    07/03/25 1920 -- 91 -- -- -- 90 % -- -- -- -- --    07/03/25 1910 -- 85 -- 133/80 98 89 % -- -- -- -- --    07/03/25 1900 -- 81 -- 131/85 100 90 % -- -- -- -- --    07/03/25 1850 -- 86 -- -- -- 89 % -- -- -- -- --    07/03/25 1840 -- 82 -- 119/75 90 87 % -- -- -- -- --    07/03/25 1830 -- 84 -- 110/73 85 87 % -- -- -- -- --    07/03/25 1820 -- 86 -- -- -- 87 % -- -- -- -- --    07/03/25 1810 -- 88 -- 134/80 98 87 % -- -- -- -- --    07/03/25 1800 -- 89 -- 123/86 98 88 % -- -- -- -- --    07/03/25 17:32:46 99.3 °F (37.4 °C) 97 -- 133/83 100 88 % -- -- -- -- --    07/03/25 1720 -- 96 -- -- -- 92 % -- -- -- -- --    07/03/25 1715 -- 98 21 112/79  90 94 % -- None (Room air) -- -- No Pain    07/03/25 1710 -- 90 8 -- -- 97 % -- -- -- -- --    07/03/25 1700 -- 90 12 110/80 92 92 % -- -- -- -- --    07/03/25 1658 98.6 °F (37 °C) 89 12 128/84 100 96 % 6 L/min Simple mask WDL -- --    07/03/25 1510 100.4 °F (38 °C) 88 20 168/110 -- 97 % -- None (Room air) -- -- 7    07/03/25 1013 -- -- -- -- -- -- -- -- -- -- 10 - Worst Possible Pain    07/03/25 1012 -- -- -- -- -- -- -- -- -- -- 10 - Worst Possible Pain    07/03/25 0810 -- -- -- -- -- -- -- -- -- -- 10 - Worst Possible Pain    07/03/25 0739 -- -- -- -- -- -- -- -- -- -- 10 - Worst Possible Pain    07/03/25 07:24:02 97.3 °F (36.3 °C) 75 20 168/95 119 95 % -- -- -- -- --    07/03/25 0038 -- -- -- -- -- -- -- -- -- -- 7    07/03/25 0031 -- -- -- -- -- -- -- -- -- -- 7          Weight (last 2 days)       None            Pertinent Labs/Diagnostic Results:   Radiology:  CT abdomen pelvis with contrast   Final Interpretation by Mateo Miles MD (07/02 0841)      5 mm distal left ureteral calculus at the vesicoureteral junction eliciting left renal inflammation and mild left hydroureteronephrosis.         Workstation performed: [id number]         FL retrograde pyelogram    (Results Pending)     Cardiology:  ECG 12 lead   Final Result by Terrence Luong MD (07/02 0924)   Sinus rhythm with occasional Premature ventricular complexes   Otherwise normal ECG   No previous ECGs available   Confirmed by Terrence Luong ([id number]) on 7/2/2025 9:24:19 AM        GI:  No orders to display           Results from last 7 days   Lab Units 07/06/25  0551 07/05/25  0636 07/04/25  0318 07/03/25  0343 07/02/25  0744   WBC Thousand/uL 7.46 9.16 14.13* 13.78* 14.64*   HEMOGLOBIN g/dL 13.4 13.3 13.5 13.4 15.5   HEMATOCRIT % 40.6 39.5 41.0 40.9 47.4   PLATELETS Thousands/uL 288 273 256 244 265   TOTAL NEUT ABS Thousands/µL  --   --   --   --  12.43*         Results from last 7 days   Lab Units 07/06/25  0551 07/05/25  0921 07/05/25 0636  "07/04/25  0318 07/03/25  0343 07/02/25  0744   SODIUM mmol/L 136  --  137 134* 137 134*   POTASSIUM mmol/L 4.0  --  3.6 4.4 3.9 3.5   CHLORIDE mmol/L 104  --  106 105 106 103   CO2 mmol/L 27  --  25 25 27 25   ANION GAP mmol/L 5  --  6 4 4 6   BUN mg/dL 13  --  15 23 18 19   CREATININE mg/dL 0.71  --  0.72 1.09 1.20 1.21   EGFR ml/min/1.73sq m 97  --  96 69 62 61   CALCIUM mg/dL 8.3*  --  7.9* 7.9* 8.1* 8.7   CALCIUM, IONIZED mmol/L  --  1.12  --   --   --   --    MAGNESIUM mg/dL  --   --   --  1.9 1.7*  --      Results from last 7 days   Lab Units 07/02/25  0744   AST U/L 14   ALT U/L 13   ALK PHOS U/L 62   TOTAL PROTEIN g/dL 7.0   ALBUMIN g/dL 3.8   TOTAL BILIRUBIN mg/dL 0.65         Results from last 7 days   Lab Units 07/06/25  0551 07/05/25  0636 07/04/25  0318 07/03/25  0343 07/02/25  0744   GLUCOSE RANDOM mg/dL 89 95 137 106 183*         Results from last 7 days   Lab Units 07/02/25  0744   HEMOGLOBIN A1C % 6.0*   EAG mg/dl 126     No results found for: \"BETA-HYDROXYBUTYRATE\"                                                                                       Results from last 7 days   Lab Units 07/02/25  1025   CLARITY UA  Clear   COLOR UA  Yellow   SPEC GRAV UA  >=1.050*   PH UA  6.0   GLUCOSE UA mg/dl 100 (1/10%)*   KETONES UA mg/dl Trace*   BLOOD UA  Small*   PROTEIN UA mg/dl 30 (1+)*   NITRITE UA  Negative   BILIRUBIN UA  Negative   UROBILINOGEN UA (BE) mg/dl <2.0   LEUKOCYTES UA  Negative   WBC UA /hpf 1-2   RBC UA /hpf 1-2   BACTERIA UA /hpf None Seen   EPITHELIAL CELLS WET PREP /hpf Occasional   MUCUS THREADS  Innumerable*                                                   Network Utilization Review Department  ATTENTION: Please call with any questions or concerns to [phone] and carefully listen to the prompts so that you are directed to the right person. All voicemails are confidential.   For Discharge needs, contact Care Management DC Support Team at [phone] opt. 2  Send all requests for " admission clinical reviews, approved or denied determinations and any other requests to dedicated fax number below belonging to the campus where the patient is receiving treatment. List of dedicated fax numbers for the Facilities:  FACILITY NAME UR FAX NUMBER   ADMISSION DENIALS (Administrative/Medical Necessity) [phone]   DISCHARGE SUPPORT TEAM (NETWORK) [phone]   PARENT CHILD HEALTH (Maternity/NICU/Pediatrics) [phone]   [facility] [phone]   [facility] [phone]   [facility] [phone]   [facility] [phone]   [facility] [phone]   [facility] [phone]   [facility] [phone]   [facility] [phone]   [facility] [phone]   [facility] [phone]   [facility] [phone]   [facility] [phone]

## 2025-07-06 NOTE — PLAN OF CARE
Problem: PAIN - ADULT  Goal: Verbalizes/displays adequate comfort level or baseline comfort level  Description: Interventions:  - Encourage patient to monitor pain and request assistance  - Assess pain using appropriate pain scale  - Administer analgesics as ordered based on type and severity of pain and evaluate response  - Implement non-pharmacological measures as appropriate and evaluate response  - Consider cultural and social influences on pain and pain management  - Notify physician/advanced practitioner if interventions unsuccessful or patient reports new pain  - Educate patient/family on pain management process including their role and importance of  reporting pain   - Provide non-pharmacologic/complimentary pain relief interventions  Outcome: Progressing     Problem: INFECTION - ADULT  Goal: Absence or prevention of progression during hospitalization  Description: INTERVENTIONS:  - Assess and monitor for signs and symptoms of infection  - Monitor lab/diagnostic results  - Monitor all insertion sites, i.e. indwelling lines, tubes, and drains  - Monitor endotracheal if appropriate and nasal secretions for changes in amount and color  - [place] appropriate cooling/warming therapies per order  - Administer medications as ordered  - Instruct and encourage patient and family to use good hand hygiene technique  - Identify and instruct in appropriate isolation precautions for identified infection/condition  Outcome: Progressing     Problem: DISCHARGE PLANNING  Goal: Discharge to home or other facility with appropriate resources  Description: INTERVENTIONS:  - Identify barriers to discharge w/patient and caregiver  - Arrange for needed discharge resources and transportation as appropriate  - Identify discharge learning needs (meds, wound care, etc.)  - Arrange for interpretive services to assist at discharge as needed  - Refer to Case Management Department for coordinating discharge planning if the patient needs  post-hospital services based on physician/advanced practitioner order or complex needs related to functional status, cognitive ability, or social support system  Outcome: Progressing

## 2025-07-06 NOTE — PLAN OF CARE
Problem: PAIN - ADULT  Goal: Verbalizes/displays adequate comfort level or baseline comfort level  Description: Interventions:  - Encourage patient to monitor pain and request assistance  - Assess pain using appropriate pain scale  - Administer analgesics as ordered based on type and severity of pain and evaluate response  - Implement non-pharmacological measures as appropriate and evaluate response  - Consider cultural and social influences on pain and pain management  - Notify physician/advanced practitioner if interventions unsuccessful or patient reports new pain  - Educate patient/family on pain management process including their role and importance of  reporting pain   - Provide non-pharmacologic/complimentary pain relief interventions  Outcome: Progressing     Problem: INFECTION - ADULT  Goal: Absence or prevention of progression during hospitalization  Description: INTERVENTIONS:  - Assess and monitor for signs and symptoms of infection  - Monitor lab/diagnostic results  - Monitor all insertion sites, i.e. indwelling lines, tubes, and drains  - Monitor endotracheal if appropriate and nasal secretions for changes in amount and color  - [place] appropriate cooling/warming therapies per order  - Administer medications as ordered  - Instruct and encourage patient and family to use good hand hygiene technique  - Identify and instruct in appropriate isolation precautions for identified infection/condition  Outcome: Progressing  Goal: Absence of fever/infection during neutropenic period  Description: INTERVENTIONS:  - Monitor WBC  - Perform strict hand hygiene  - Limit to healthy visitors only  - No plants, dried, fresh or silk flowers with anderson in patient room  Outcome: Progressing     Problem: DISCHARGE PLANNING  Goal: Discharge to home or other facility with appropriate resources  Description: INTERVENTIONS:  - Identify barriers to discharge w/patient and caregiver  - Arrange for needed discharge resources  and transportation as appropriate  - Identify discharge learning needs (meds, wound care, etc.)  - Arrange for interpretive services to assist at discharge as needed  - Refer to Case Management Department for coordinating discharge planning if the patient needs post-hospital services based on physician/advanced practitioner order or complex needs related to functional status, cognitive ability, or social support system  Outcome: Progressing     Problem: Knowledge Deficit  Goal: Patient/family/caregiver demonstrates understanding of disease process, treatment plan, medications, and discharge instructions  Description: Complete learning assessment and assess knowledge base.  Interventions:  - Provide teaching at level of understanding  - Provide teaching via preferred learning methods  Outcome: Progressing

## 2025-07-07 NOTE — UTILIZATION REVIEW
NOTIFICATION OF INPATIENT ADMISSION   AUTHORIZATION REQUEST   SERVICING FACILITY:   [address] [place] [address]  Tax ID: [id number]  NPI: [id number]    Effective 6/28/25, [facility] has become a new legal entity. TAX ID # is now [id number] and NPI # is now [id number].  ATTENDING PROVIDER:  Attending Name and NPI#: Awais Bryant Md [[id number]]  Address: [address] [place] [address]  Phone: [phone]     ADMISSION INFORMATION:  Place of Service: Inpatient [facility]  Place of Service Code: 21  Inpatient Admission Date/Time: 7/5/25  7:33 AM  Discharge Date/Time: 7/6/2025  3:36 PM  Admitting Diagnosis Code/Description:  Nausea [R11.0]  Abdominal pain [R10.9]  Obstructive uropathy [N13.9]  Intractable pain [R52]     UTILIZATION REVIEW CONTACT:  Mattie Chou Utilization   Network Utilization Review Department  Phone: [phone]  Fax: [phone]  Email: [email or web address]  Contact for approvals/pending authorizations, clinical reviews, and discharge.     PHYSICIAN ADVISORY SERVICES:  Medical Necessity Denial & Hzjb-un-Wriz Review  Phone: [phone]  Fax: [phone]  Email: [email or web address]     DISCHARGE SUPPORT TEAM:  For Patients Discharge Needs & Updates  Phone: [phone] opt. 2 Fax: [phone]  Email: [email or web address]

## 2025-07-07 NOTE — TELEPHONE ENCOUNTER
New Patient      Insurance   Current Insurance?[employer]    Insurance E-verified? yes    History   Reason for appointment/active symptoms?  Patient's SO Radha called stating patient had surgery and has a stent and a bronson catheter and she needs to have it removed. Patient does not want to go to [place].      Patient prefers to go to [facility] for Urology.      Please call Radha for appointment arrangements.    [phone]     Has the patient had any previous Urologist(s)? no      Was the patient seen in the ED?      Labs/Imaging(Including Out Of Network)?      Records Requested?   Records Visible in EPIC?      Personal history of cancer? No      Appointment   Office location preference:  [place]   ?   Appointment Details   Date:    Time:    Location:    Provider:    Does the appointment need further review?

## 2025-07-07 NOTE — TELEPHONE ENCOUNTER
Lisa called to check on thr status on when pt needs to remove the stent and a cath.     Lisa can be reached at [phone]

## 2025-07-07 NOTE — TELEPHONE ENCOUNTER
"Patient's s/o called to ask that the appt be either before the 14th maybe on that Friday? Patient will be away for a weeding and wont be able to make it on time on the 14th. Sooner appt was not avail. Patient is rescheduled to 7/15 at 11 AM in [facility]. Caller will attempt to retreat address from [facility].     In addition spouse is asking that patient not be seen by Dr Smith for future appts stating \"personal reasons\". She is aware that patient would probably need to be seen in another office location moving forward.    Patient will have KUB done hour ptv.     Call back if needed Francy [phone]  "

## 2025-07-07 NOTE — UTILIZATION REVIEW
NOTIFICATION OF ADMISSION DISCHARGE   This is a Notification of Discharge from [facility]. Please be advised that this patient has been discharge from our facility. Below you will find the admission and discharge date and time including the patient’s disposition.   UTILIZATION REVIEW CONTACT:  Utilization Review Assistants  Network Utilization Review Department  Phone: [phone] x carefully listen to the prompts. All voicemails are confidential.  Email: [email or web address]     ADMISSION INFORMATION  PRESENTATION DATE: 7/2/2025  7:09 AM  OBERVATION ADMISSION DATE: 07/02/2025 0858  INPATIENT ADMISSION DATE: 7/5/25  7:33 AM   DISCHARGE DATE: 7/6/2025  3:36 PM   DISPOSITION:Home/Self Care    Network Utilization Review Department  ATTENTION: Please call with any questions or concerns to [phone] and carefully listen to the prompts so that you are directed to the right person. All voicemails are confidential.   For Discharge needs, contact Care Management DC Support Team at [phone] opt. 2  Send all requests for admission clinical reviews, approved or denied determinations and any other requests to dedicated fax number below belonging to the campus where the patient is receiving treatment. List of dedicated fax numbers for the Facilities:  FACILITY NAME UR FAX NUMBER   ADMISSION DENIALS (Administrative/Medical Necessity) [phone]   DISCHARGE SUPPORT TEAM ([facility]) [phone]   PARENT CHILD HEALTH (Maternity/NICU/Pediatrics) [phone]   [facility] [phone]   [facility] [phone]   [facility] [phone]   [facility] [phone]   [facility] [phone]   [facility] [phone]   [facility] [phone]   [facility] [phone]   [facility]  [facility] [phone]   [facility] [phone]   [facility] [phone]   [facility] [phone]

## 2025-07-08 NOTE — TELEPHONE ENCOUNTER
I got in touch with Radha anyway to let her know that pt had the soonest appt given the parameters of where the pt would like to be seen. We reviewed a sooner stent/bronson removal option at [facility] on 7/11/25 at 11:40 with Braulio. They are traveling for a wedding over the weekend and given the circumstances, were agreeable to having the stent/bronson removed on 7/11/25. We cancelled the 7/15/25 appt with Suze.     Ultimately, the pt would like to continue care at [place]. I was candid with the fact that due to our providers' schedules being so inundated, we often make exceptions with having pts seen in multiple offices as to not delay care for time sensitive issues. Assured Radha that we would be able to eventually establish at [place] and that Braulio will give them direction for scheduling going forward at the removal appt on 7/11/25.     Per her request, I also sent her an email with pt's KUB xray order requisition. She says she will make sure it is competed ptv on 7/11/25. Radha was appreciative of the call.

## 2025-07-11 ENCOUNTER — TELEPHONE (OUTPATIENT)
Dept: UROLOGY | Facility: CLINIC | Age: 70
End: 2025-07-11

## 2025-07-11 ENCOUNTER — HOSPITAL ENCOUNTER (OUTPATIENT)
Dept: RADIOLOGY | Facility: HOSPITAL | Age: 70
Discharge: HOME | End: 2025-07-11
Payer: COMMERCIAL

## 2025-07-11 ENCOUNTER — DOCUMENTATION (OUTPATIENT)
Dept: UROLOGY | Facility: CLINIC | Age: 70
End: 2025-07-11

## 2025-07-11 DIAGNOSIS — Z87.442 PERSONAL HISTORY OF KIDNEY STONES: ICD-10-CM

## 2025-07-11 PROCEDURE — 74018 RADEX ABDOMEN 1 VIEW: CPT

## 2025-07-11 NOTE — TELEPHONE ENCOUNTER
Please see other encounter for full thread and updated encounter    Other encounter date is 7/4/2025

## 2025-07-11 NOTE — TELEPHONE ENCOUNTER
Patient was returning a call to the office. Per the previous encounter, it was to let him know that he has been scheduled for an appt with Braulio for this Monday 7/14 at 8:20 am    Patient can not make that appt. Rescheduled for the next available that happened to be the following Wednesday. Patient has been scheduled for 7/16 at 11:20 am with Braulio.     Patient also have a question for Braulio.     With the knick that the kidney stone caused. Is he getting bacteria inside the kidney or track?    Patient would appreciate a call back to discuss  [phone]

## 2025-07-11 NOTE — PROGRESS NOTES
"Name: Cj Angelo      : 1955      MRN: [id number]  Encounter Provider: Braulio Srinivasan PA-C  Encounter Date: 2025   Encounter department: [facility]  :  Assessment & Plan  Calculus of kidney  CT 2025 indicated a 5 mm distal ureteral stone at UVJ stone with hydro and a 6 mm nonobstructing renal calculi  2025 left laser lithotripsy secondary to 5 mm distal stone Dr. Duarte.    Upon review with Dr. Duarte he was noted to have some calyceal rupture and preferred to have stent in closer to 2 weeks to allow more time to heal  -Will arrange for stent removal next week           History of Present Illness   Cj Angelo is a 69 y.o. male with history of renal calculi underwent left laser lithotripsy 2025 secondary to 5 mL distal ureteral stone.  Currently presents for cystoscopy and stent removal.         Radiology  2025 CT abdomen/pelvis with contrast  5 mm distal ureteral stone at UVJ stone with hydro  6 mm nonobstructing renal calculi    Labs       Past Medical History  Basal cell carcinoma status post Mohs surgery    Past  History  Renal calculi    Past  surgical history   2025 left laser lithotripsy secondary to 5 mm distal stone Dr. Duarte    Prior Visits             Objective   There were no vitals taken for this visit.      Results   No results found for: \"PSA\"  Lab Results   Component Value Date    CALCIUM 8.3 (L) 2025    K 4.0 2025    CO2 27 2025     2025    BUN 13 2025    CREATININE 0.71 2025     Lab Results   Component Value Date    WBC 7.46 2025    HGB 13.4 2025    HCT 40.6 2025    MCV 91 2025     2025       Office Urine Dip  No results found for this or any previous visit (from the past hour).        "

## 2025-07-11 NOTE — TELEPHONE ENCOUNTER
Left message for patient informing of appt scheduled for Monday 7/14 at 8:20 with Braulio Srinivasan in [place]

## 2025-07-14 LAB
COLOR STONE: NORMAL
COM MFR STONE: 100 %
SIZE STONE: NORMAL MM
SPEC SOURCE SUBJ: NORMAL
STONE ANALYSIS-IMP: NORMAL
WT STONE: 43 MG

## 2025-07-15 NOTE — PROGRESS NOTES
Name: Cj Angelo      : 1955      MRN: [id number]  Encounter Provider: Braulio Srinivasan PA-C  Encounter Date: 2025   Encounter department: [facility]  :  Assessment & Plan  Calculus of kidney  2025 CT with contrast indicating 5 mm distal left ureteral stone and 6 mm left renal calculus  2025 cystoscopy, left laser lithotripsy Dr. Duarte  (Ca Ox mono 100%)  -f/u 6 months renal US and KUB prior   -finish up the keflex 500mg BID   -ER if unable to void, fever, chills, or flank pain    - Increase fluids and low oxalate diet.    Orders:    US kidney and bladder with pvr; Future    XR abdomen 1 view kub; Future    Benign prostatic hyperplasia with urinary frequency  BPH with occ freqeuncy  Hx of retention at time of stone .  Placed on tamsulosin 0.4 mg  Patient able to void after cystoscopy and stent placement in office.  -continue with the tamulsoin 0.4mg   -f/u uroflow/PVR and CONTRERAS with PSA prior in 2-3 months   Orders:    PSA, Total Screen; Future    Obstructive uropathy    Orders:    tamsulosin (FLOMAX) 0.4 mg; Take 1 capsule (0.4 mg total) by mouth daily with dinner for 7 days Take to help with stent discomfort        History of Present Illness   Cj Angelo is a 69 y.o. male with a history of calcium oxalate monohydrate stone underwent left laser lithotripsy 07/0/3/25 with stent placement.  Currently presents for cystoscopy and stent removal.  Notes no longer having flank pain and doing well with his Low catheter.         Radiology  2025 CT AP with contrast  5 mm distal left ureteral stone with hydronephrosis.  6 mm left renal calculus    Labs       Past Medical History  Basal cell carcinoma    Past  History  Renal calculus    Past  surgical history   2025 cystoscopy, left laser lithotripsy Dr. Duarte  (Ca Ox mono 100%)    Prior Visits             Objective   /80 (BP Location: Left arm, Patient Position: Sitting, Cuff Size:  "Adult)   Pulse 75   Ht 5' 11\" (1.803 m)   Wt 85.3 kg (188 lb)   SpO2 98%   BMI 26.22 kg/m²     Physical Exam      Results   No results found for: \"PSA\"  Lab Results   Component Value Date    CALCIUM 8.3 (L) 07/06/2025    K 4.0 07/06/2025    CO2 27 07/06/2025     07/06/2025    BUN 13 07/06/2025    CREATININE 0.71 07/06/2025     Lab Results   Component Value Date    WBC 7.46 07/06/2025    HGB 13.4 07/06/2025    HCT 40.6 07/06/2025    MCV 91 07/06/2025     07/06/2025       Office Urine Dip  No results found for this or any previous visit (from the past hour).        "

## 2025-07-16 ENCOUNTER — OFFICE VISIT (OUTPATIENT)
Dept: UROLOGY | Facility: CLINIC | Age: 70
End: 2025-07-16
Payer: COMMERCIAL

## 2025-07-16 VITALS
HEART RATE: 75 BPM | DIASTOLIC BLOOD PRESSURE: 80 MMHG | SYSTOLIC BLOOD PRESSURE: 130 MMHG | BODY MASS INDEX: 26.32 KG/M2 | OXYGEN SATURATION: 98 % | HEIGHT: 71 IN | WEIGHT: 188 LBS

## 2025-07-16 DIAGNOSIS — N13.9 OBSTRUCTIVE UROPATHY: ICD-10-CM

## 2025-07-16 DIAGNOSIS — N40.1 BENIGN PROSTATIC HYPERPLASIA WITH URINARY FREQUENCY: ICD-10-CM

## 2025-07-16 DIAGNOSIS — R35.0 BENIGN PROSTATIC HYPERPLASIA WITH URINARY FREQUENCY: ICD-10-CM

## 2025-07-16 DIAGNOSIS — N20.0 CALCULUS OF KIDNEY: Primary | ICD-10-CM

## 2025-07-16 PROCEDURE — 52310 CYSTOSCOPY AND TREATMENT: CPT | Performed by: PHYSICIAN ASSISTANT

## 2025-07-16 PROCEDURE — 99024 POSTOP FOLLOW-UP VISIT: CPT | Performed by: PHYSICIAN ASSISTANT

## 2025-07-16 RX ORDER — TAMSULOSIN HYDROCHLORIDE 0.4 MG/1
0.4 CAPSULE ORAL
Qty: 30 CAPSULE | Refills: 11 | Status: SHIPPED | OUTPATIENT
Start: 2025-07-16 | End: 2025-07-31

## 2025-07-16 NOTE — PROGRESS NOTES
Cystoscopy     Date/Time  7/16/2025 11:20 AM     Performed by  Braulio Srinivasan PA-C   Authorized by  Braulio Srinivasan PA-C       Universal Protocol:  procedure performed by consultantConsent: Written consent obtained  Risks and benefits: risks, benefits and alternatives were discussed  Consent given by: patient  Patient understanding: patient states understanding of the procedure being performed  Patient consent: the patient's understanding of the procedure matches consent given  Procedure consent: procedure consent matches procedure scheduled  Relevant documents: relevant documents present and verified  Radiology Images displayed and confirmed. If images not available, report reviewed: imaging studies available  Patient identity confirmed: verbally with patient      Procedure Details:  Procedure type: simple removal of a foreign body, stone, or stent    Patient tolerance: Patient tolerated the procedure well with no immediate complications    Additional Procedure Details: The patient returns to the office today to undergo cystoscopy with left stent removal. Risk and benefits of the procedure were discussed and informed consent was obtained.  The patient was prescribed preprocedure antibiosis to decrease any chance of urinary tract infection or sepsis upon ureteral catheter removal, as per the guidelines.  The patient was placed in the modified supine position. The genitalia were prepped and draped in a sterile fashion. Viscous lidocaine was used for local anesthesia. The flexible cystoscope was passed. The bladder was inspected. The stent was identified coming from the left ureteral orifice. The stent was grasped with a flexible grasper and was then removed in its entirety without complications. Overall the patient tolerated the procedure.    They were made aware to advise our office of any fevers greater than 101 degrees Fahrenheit, malaise, or chills.  They were advised that it is normal to have  cramping pain on the ipsilateral side for a day or so after ureteral stent removal.  They are to remain well hydrated in the coming days.

## 2025-07-31 ENCOUNTER — OFFICE VISIT (OUTPATIENT)
Age: 70
End: 2025-07-31
Payer: COMMERCIAL

## 2025-07-31 VITALS
WEIGHT: 190 LBS | HEIGHT: 71 IN | HEART RATE: 65 BPM | BODY MASS INDEX: 26.6 KG/M2 | SYSTOLIC BLOOD PRESSURE: 130 MMHG | OXYGEN SATURATION: 96 % | DIASTOLIC BLOOD PRESSURE: 74 MMHG

## 2025-07-31 DIAGNOSIS — Z00.00 ANNUAL PHYSICAL EXAM: ICD-10-CM

## 2025-07-31 DIAGNOSIS — N13.9 OBSTRUCTIVE UROPATHY: ICD-10-CM

## 2025-07-31 DIAGNOSIS — R73.03 PREDIABETES: ICD-10-CM

## 2025-07-31 DIAGNOSIS — K29.70 GASTRITIS: ICD-10-CM

## 2025-07-31 DIAGNOSIS — N20.0 NEPHROLITHIASIS: ICD-10-CM

## 2025-07-31 DIAGNOSIS — Z12.11 SCREENING FOR COLON CANCER: Primary | ICD-10-CM

## 2025-07-31 DIAGNOSIS — Z13.29 SCREENING FOR THYROID DISORDER: ICD-10-CM

## 2025-07-31 DIAGNOSIS — R73.03 PRE-DIABETES: ICD-10-CM

## 2025-07-31 DIAGNOSIS — E78.5 DYSLIPIDEMIA: ICD-10-CM

## 2025-07-31 PROCEDURE — 99397 PER PM REEVAL EST PAT 65+ YR: CPT | Performed by: INTERNAL MEDICINE

## 2025-07-31 PROCEDURE — 99214 OFFICE O/P EST MOD 30 MIN: CPT | Performed by: INTERNAL MEDICINE

## (undated) DEVICE — Device